# Patient Record
Sex: FEMALE | Race: WHITE | NOT HISPANIC OR LATINO
[De-identification: names, ages, dates, MRNs, and addresses within clinical notes are randomized per-mention and may not be internally consistent; named-entity substitution may affect disease eponyms.]

---

## 2017-01-11 ENCOUNTER — APPOINTMENT (OUTPATIENT)
Dept: CARDIOLOGY | Facility: CLINIC | Age: 67
End: 2017-01-11

## 2017-01-11 ENCOUNTER — NON-APPOINTMENT (OUTPATIENT)
Age: 67
End: 2017-01-11

## 2017-01-11 VITALS
HEART RATE: 60 BPM | BODY MASS INDEX: 24.99 KG/M2 | HEIGHT: 65 IN | OXYGEN SATURATION: 98 % | SYSTOLIC BLOOD PRESSURE: 142 MMHG | DIASTOLIC BLOOD PRESSURE: 71 MMHG | WEIGHT: 150 LBS

## 2017-01-11 DIAGNOSIS — I35.0 NONRHEUMATIC AORTIC (VALVE) STENOSIS: ICD-10-CM

## 2017-06-16 ENCOUNTER — APPOINTMENT (OUTPATIENT)
Dept: CARDIOLOGY | Facility: CLINIC | Age: 67
End: 2017-06-16

## 2017-06-16 VITALS — SYSTOLIC BLOOD PRESSURE: 110 MMHG | DIASTOLIC BLOOD PRESSURE: 68 MMHG

## 2017-06-16 VITALS
OXYGEN SATURATION: 99 % | SYSTOLIC BLOOD PRESSURE: 97 MMHG | HEART RATE: 55 BPM | DIASTOLIC BLOOD PRESSURE: 61 MMHG | WEIGHT: 162 LBS | HEIGHT: 65 IN | BODY MASS INDEX: 26.99 KG/M2

## 2017-06-16 DIAGNOSIS — Z09 ENCOUNTER FOR FOLLOW-UP EXAMINATION AFTER COMPLETED TREATMENT FOR CONDITIONS OTHER THAN MALIGNANT NEOPLASM: ICD-10-CM

## 2017-06-16 DIAGNOSIS — E03.9 HYPOTHYROIDISM, UNSPECIFIED: ICD-10-CM

## 2017-06-16 RX ORDER — APIXABAN 5 MG/1
5 TABLET, FILM COATED ORAL
Qty: 30 | Refills: 3 | Status: ACTIVE | COMMUNITY
Start: 2017-06-10

## 2017-06-16 RX ORDER — IBANDRONATE SODIUM 150 MG/1
150 TABLET ORAL
Qty: 3 | Refills: 0 | Status: ACTIVE | COMMUNITY
Start: 2017-03-06

## 2017-07-06 ENCOUNTER — APPOINTMENT (OUTPATIENT)
Dept: CARDIOLOGY | Facility: CLINIC | Age: 67
End: 2017-07-06

## 2017-07-06 VITALS
DIASTOLIC BLOOD PRESSURE: 75 MMHG | OXYGEN SATURATION: 98 % | WEIGHT: 159 LBS | HEART RATE: 52 BPM | SYSTOLIC BLOOD PRESSURE: 150 MMHG | BODY MASS INDEX: 26.46 KG/M2

## 2017-07-06 VITALS — DIASTOLIC BLOOD PRESSURE: 82 MMHG | SYSTOLIC BLOOD PRESSURE: 144 MMHG | HEART RATE: 49 BPM

## 2017-07-06 DIAGNOSIS — Z78.9 OTHER SPECIFIED HEALTH STATUS: ICD-10-CM

## 2017-07-06 DIAGNOSIS — I25.10 ATHEROSCLEROTIC HEART DISEASE OF NATIVE CORONARY ARTERY W/OUT ANGINA PECTORIS: ICD-10-CM

## 2017-07-06 DIAGNOSIS — R06.09 OTHER FORMS OF DYSPNEA: ICD-10-CM

## 2017-07-06 DIAGNOSIS — R00.2 PALPITATIONS: ICD-10-CM

## 2017-07-06 DIAGNOSIS — I48.91 UNSPECIFIED ATRIAL FIBRILLATION: ICD-10-CM

## 2017-07-06 DIAGNOSIS — R53.83 OTHER FATIGUE: ICD-10-CM

## 2017-07-06 DIAGNOSIS — E78.00 PURE HYPERCHOLESTEROLEMIA, UNSPECIFIED: ICD-10-CM

## 2017-07-07 RX ORDER — METOPROLOL SUCCINATE 25 MG/1
25 TABLET, EXTENDED RELEASE ORAL DAILY
Qty: 1 | Refills: 0 | Status: DISCONTINUED | COMMUNITY
Start: 2017-06-16 | End: 2017-07-07

## 2017-07-20 ENCOUNTER — NON-APPOINTMENT (OUTPATIENT)
Age: 67
End: 2017-07-20

## 2017-07-20 PROBLEM — Z09 HOSPITAL DISCHARGE FOLLOW-UP: Status: ACTIVE | Noted: 2017-07-20

## 2017-08-01 ENCOUNTER — RESULT CHARGE (OUTPATIENT)
Age: 67
End: 2017-08-01

## 2017-08-01 ENCOUNTER — APPOINTMENT (OUTPATIENT)
Dept: CARDIOLOGY | Facility: CLINIC | Age: 67
End: 2017-08-01
Payer: MEDICARE

## 2017-08-01 PROCEDURE — 93268 ECG RECORD/REVIEW: CPT

## 2017-08-20 ENCOUNTER — NON-APPOINTMENT (OUTPATIENT)
Age: 67
End: 2017-08-20

## 2017-08-20 PROBLEM — R00.2 HEART PALPITATIONS: Status: ACTIVE | Noted: 2017-07-20

## 2017-08-20 PROBLEM — R53.83 FATIGUE: Status: ACTIVE | Noted: 2017-08-20

## 2017-08-31 ENCOUNTER — APPOINTMENT (OUTPATIENT)
Dept: DERMATOLOGY | Facility: CLINIC | Age: 67
End: 2017-08-31
Payer: MEDICARE

## 2017-08-31 PROCEDURE — 99202 OFFICE O/P NEW SF 15 MIN: CPT | Mod: 25

## 2017-08-31 PROCEDURE — 17110 DESTRUCTION B9 LES UP TO 14: CPT

## 2017-08-31 PROCEDURE — 17000 DESTRUCT PREMALG LESION: CPT | Mod: 59

## 2017-09-26 ENCOUNTER — APPOINTMENT (OUTPATIENT)
Dept: CARDIOLOGY | Facility: CLINIC | Age: 67
End: 2017-09-26

## 2017-09-29 ENCOUNTER — APPOINTMENT (OUTPATIENT)
Dept: CARDIOLOGY | Facility: CLINIC | Age: 67
End: 2017-09-29

## 2017-10-06 ENCOUNTER — APPOINTMENT (OUTPATIENT)
Dept: NEUROLOGY | Facility: CLINIC | Age: 67
End: 2017-10-06
Payer: MEDICARE

## 2017-10-06 VITALS
SYSTOLIC BLOOD PRESSURE: 128 MMHG | WEIGHT: 152 LBS | BODY MASS INDEX: 25.33 KG/M2 | HEIGHT: 65 IN | DIASTOLIC BLOOD PRESSURE: 74 MMHG

## 2017-10-06 PROCEDURE — 99213 OFFICE O/P EST LOW 20 MIN: CPT

## 2017-12-15 ENCOUNTER — MEDICATION RENEWAL (OUTPATIENT)
Age: 67
End: 2017-12-15

## 2018-06-26 ENCOUNTER — APPOINTMENT (OUTPATIENT)
Dept: NEUROLOGY | Facility: CLINIC | Age: 68
End: 2018-06-26
Payer: MEDICARE

## 2018-06-26 VITALS
WEIGHT: 155 LBS | BODY MASS INDEX: 25.83 KG/M2 | SYSTOLIC BLOOD PRESSURE: 125 MMHG | HEIGHT: 65 IN | DIASTOLIC BLOOD PRESSURE: 70 MMHG

## 2018-06-26 DIAGNOSIS — G44.209 TENSION-TYPE HEADACHE, UNSPECIFIED, NOT INTRACTABLE: ICD-10-CM

## 2018-06-26 DIAGNOSIS — M79.89 OTHER SPECIFIED SOFT TISSUE DISORDERS: ICD-10-CM

## 2018-06-26 PROCEDURE — 99215 OFFICE O/P EST HI 40 MIN: CPT

## 2018-06-28 ENCOUNTER — APPOINTMENT (OUTPATIENT)
Dept: NEUROLOGY | Facility: CLINIC | Age: 68
End: 2018-06-28
Payer: MEDICARE

## 2018-06-28 PROCEDURE — 93040 RHYTHM ECG WITH REPORT: CPT

## 2018-06-28 PROCEDURE — 95819 EEG AWAKE AND ASLEEP: CPT

## 2018-10-02 ENCOUNTER — APPOINTMENT (OUTPATIENT)
Dept: NEUROLOGY | Facility: CLINIC | Age: 68
End: 2018-10-02
Payer: MEDICARE

## 2018-10-02 VITALS
DIASTOLIC BLOOD PRESSURE: 70 MMHG | SYSTOLIC BLOOD PRESSURE: 120 MMHG | BODY MASS INDEX: 25.83 KG/M2 | WEIGHT: 155 LBS | HEIGHT: 65 IN

## 2018-10-02 PROCEDURE — 99213 OFFICE O/P EST LOW 20 MIN: CPT

## 2018-10-19 ENCOUNTER — TRANSCRIPTION ENCOUNTER (OUTPATIENT)
Age: 68
End: 2018-10-19

## 2018-10-30 ENCOUNTER — TRANSCRIPTION ENCOUNTER (OUTPATIENT)
Age: 68
End: 2018-10-30

## 2018-11-06 ENCOUNTER — TRANSCRIPTION ENCOUNTER (OUTPATIENT)
Age: 68
End: 2018-11-06

## 2019-01-24 ENCOUNTER — RX RENEWAL (OUTPATIENT)
Age: 69
End: 2019-01-24

## 2019-01-30 ENCOUNTER — APPOINTMENT (OUTPATIENT)
Dept: DERMATOLOGY | Facility: CLINIC | Age: 69
End: 2019-01-30
Payer: MEDICARE

## 2019-01-30 PROCEDURE — 17003 DESTRUCT PREMALG LES 2-14: CPT

## 2019-01-30 PROCEDURE — 99214 OFFICE O/P EST MOD 30 MIN: CPT | Mod: 25

## 2019-01-30 PROCEDURE — 17000 DESTRUCT PREMALG LESION: CPT

## 2019-05-07 ENCOUNTER — APPOINTMENT (OUTPATIENT)
Dept: NEUROLOGY | Facility: CLINIC | Age: 69
End: 2019-05-07
Payer: MEDICARE

## 2019-05-07 VITALS
WEIGHT: 152 LBS | DIASTOLIC BLOOD PRESSURE: 70 MMHG | SYSTOLIC BLOOD PRESSURE: 102 MMHG | HEIGHT: 65 IN | BODY MASS INDEX: 25.33 KG/M2

## 2019-05-07 PROCEDURE — 99213 OFFICE O/P EST LOW 20 MIN: CPT

## 2019-05-07 NOTE — PHYSICAL EXAM
[Person] : oriented to person [Time] : oriented to time [Remote Intact] : remote memory intact [Place] : oriented to place [Concentration Intact] : normal concentrating ability [Span Intact] : the attention span was normal [Registration Intact] : recent registration memory intact [Visual Intact] : visual attention was ~T not ~L decreased [Naming Objects] : no difficulty naming common objects [Fluency] : fluency intact [Comprehension] : comprehension intact [Repeating Phrases] : no difficulty repeating a phrase [Past History] : adequate knowledge of personal past history [Current Events] : adequate knowledge of current events [Cranial Nerves Optic (II)] : visual acuity intact bilaterally,  visual fields full to confrontation, pupils equal round and reactive to light [Cranial Nerves Oculomotor (III)] : extraocular motion intact [Cranial Nerves Facial (VII)] : face symmetrical [Cranial Nerves Trigeminal (V)] : facial sensation intact symmetrically [Cranial Nerves Vestibulocochlear (VIII)] : hearing was intact bilaterally [Cranial Nerves Glossopharyngeal (IX)] : tongue and palate midline [Cranial Nerves Accessory (XI - Cranial And Spinal)] : head turning and shoulder shrug symmetric [Cranial Nerves Hypoglossal (XII)] : there was no tongue deviation with protrusion [No Muscle Atrophy] : normal bulk in all four extremities [Motor Strength] : muscle strength was normal in all four extremities [Involuntary Movements] : no involuntary movements were seen [Motor Tone] : muscle tone was normal in all four extremities [Paresis Pronator Drift Right-Sided] : no pronator drift on the right [Motor Handedness Right-Handed] : the patient is right hand dominant [Paresis Pronator Drift Left-Sided] : no pronator drift on the left [Sensation Tactile Decrease] : light touch was intact [Sensation Vibration Decrease] : vibration was intact [Sensation Pain / Temperature Decrease] : pain and temperature was intact [Proprioception] : proprioception was intact [Abnormal Walk] : normal gait [Balance] : balance was intact [Coordination - Dysmetria Impaired Finger-to-Nose Bilateral] : not present [Tremor] : no tremor present [2+] : Patella left 2+ [Extraocular Movements] : extraocular movements were intact [Sclera] : the sclera and conjunctiva were normal [PERRL With Normal Accommodation] : pupils were equal in size, round, reactive to light, with normal accommodation [No APD] : no afferent pupillary defect [Full Visual Field] : full visual field [No DREA] : no internuclear ophthalmoplegia

## 2019-05-07 NOTE — ASSESSMENT
[FreeTextEntry1] : This is a 69-year-old woman with history of seizure disorder. She is currently stable on the medical service on milligrams twice daily. She will continue this dose. I will see her back in the office in 6 months, sooner should the need arise.

## 2019-05-07 NOTE — CONSULT LETTER
[Courtesy Letter:] : I had the pleasure of seeing your patient, [unfilled], in my office today. [Dear  ___] : Dear  [unfilled], [Please see my note below.] : Please see my note below. [Consult Closing:] : Thank you very much for allowing me to participate in the care of this patient.  If you have any questions, please do not hesitate to contact me. [FreeTextEntry3] : Mauro Barboza M.D., Ph.D. DPN-N\par [facility]\par Neurology at [place]\par Medical Director of Stroke Services\par [facility]\par  [Sincerely,] : Sincerely,

## 2019-05-07 NOTE — HISTORY OF PRESENT ILLNESS
[FreeTextEntry1] : 10/6/17:\par This is a 67-year-old woman follows up today with seizure disorder. She is currently taking Lamictal 75 mg twice a day. She is stable without any evidence of seizure activity. When asked she hasn't had any evidence of rash. She did have an episode of atrial fibrillation in June. She was started Eliquis as well as amiodarone for this. She is doing well otherwise. She is here today for routine neurologic followup.\par \par Followup June 26, 2018:\par This is a 68-year-old woman follows up today with seizure disorder. She is also had new complaints of an episode of syncope, headache as well as leg swelling. Regarding her seizures she is stable on lamotrigine 75 mg twice a day. She does not have any seizures since being seen last October however she did have an episode of syncope. She states this was in the setting of stress and dehydration when her  was in the hospital for surgery. She felt as if she was going to collapse and did. She was given orange juice and liquids and then felt better. Subsequent to this she had an episode of dull headache. It started in the occiput and radiated up to the front. She was treated with a Medrol Dosepak and the headache was relieved. Lastly she just returned from a trip to [place] and has bilateral left greater than right leg swelling. She states her leg swelling started while in [place] and she attempted to walking. She is on anticoagulation with Eliquis for her atrial fibrillation. She is here today for neurologic followup and to address these new symptoms.\par \par Followup October 2, 2018:\par This is a 68-year-old woman who presents today for followup of seizure disorder. She's not had any seizures recently. She is maintained on Lamictal 75 mg twice daily. She's taking the medication as directed without side effects. She's overall doing quite well. She is here today for routine neurologic followup.\par \par Followup May 7, 2019:\par This is a 69-year-old woman who presents today for followup of seizure disorder. She is currently taking Lamictal 75 mg twice daily. She has not had any seizures. She tolerates the medication well. She is not missing doses. She's overall doing very well from a seizure point of view. She is here today for routine neurologic followup.

## 2019-09-03 ENCOUNTER — TRANSCRIPTION ENCOUNTER (OUTPATIENT)
Age: 69
End: 2019-09-03

## 2019-11-05 ENCOUNTER — APPOINTMENT (OUTPATIENT)
Dept: NEUROLOGY | Facility: CLINIC | Age: 69
End: 2019-11-05

## 2019-12-17 ENCOUNTER — APPOINTMENT (OUTPATIENT)
Dept: NEUROLOGY | Facility: CLINIC | Age: 69
End: 2019-12-17
Payer: MEDICARE

## 2019-12-17 VITALS
HEIGHT: 65 IN | DIASTOLIC BLOOD PRESSURE: 74 MMHG | WEIGHT: 155 LBS | BODY MASS INDEX: 25.83 KG/M2 | SYSTOLIC BLOOD PRESSURE: 128 MMHG

## 2019-12-17 PROCEDURE — 99213 OFFICE O/P EST LOW 20 MIN: CPT

## 2019-12-17 NOTE — HISTORY OF PRESENT ILLNESS
[FreeTextEntry1] : 10/6/17:\par This is a 67-year-old woman follows up today with seizure disorder. She is currently taking Lamictal 75 mg twice a day. She is stable without any evidence of seizure activity. When asked she hasn't had any evidence of rash. She did have an episode of atrial fibrillation in June. She was started Eliquis as well as amiodarone for this. She is doing well otherwise. She is here today for routine neurologic followup.\par \par Followup June 26, 2018:\par This is a 68-year-old woman follows up today with seizure disorder. She is also had new complaints of an episode of syncope, headache as well as leg swelling. Regarding her seizures she is stable on lamotrigine 75 mg twice a day. She does not have any seizures since being seen last October however she did have an episode of syncope. She states this was in the setting of stress and dehydration when her  was in the hospital for surgery. She felt as if she was going to collapse and did. She was given orange juice and liquids and then felt better. Subsequent to this she had an episode of dull headache. It started in the occiput and radiated up to the front. She was treated with a Medrol Dosepak and the headache was relieved. Lastly she just returned from a trip to [place] and has bilateral left greater than right leg swelling. She states her leg swelling started while in [place] and she attempted to walking. She is on anticoagulation with Eliquis for her atrial fibrillation. She is here today for neurologic followup and to address these new symptoms.\par \par Followup October 2, 2018:\par This is a 68-year-old woman who presents today for followup of seizure disorder. She's not had any seizures recently. She is maintained on Lamictal 75 mg twice daily. She's taking the medication as directed without side effects. She's overall doing quite well. She is here today for routine neurologic followup.\par \par Followup May 7, 2019:\par This is a 69-year-old woman who presents today for followup of seizure disorder. She is currently taking Lamictal 75 mg twice daily. She has not had any seizures. She tolerates the medication well. She is not missing doses. She's overall doing very well from a seizure point of view. She is here today for routine neurologic followup.\par \par Followup December 17, 2019:\par This is a 69-year-old woman who presents today for followup of seizure disorder. She is currently stable on Lamictal 75 mg twice a day. She is not having any side effects to the medication. She reports excellent compliance. She is here today for routine followup.

## 2019-12-17 NOTE — PHYSICAL EXAM
[Person] : oriented to person [Place] : oriented to place [Time] : oriented to time [Remote Intact] : remote memory intact [Registration Intact] : recent registration memory intact [Span Intact] : the attention span was normal [Concentration Intact] : normal concentrating ability [Visual Intact] : visual attention was ~T not ~L decreased [Naming Objects] : no difficulty naming common objects [Repeating Phrases] : no difficulty repeating a phrase [Fluency] : fluency intact [Comprehension] : comprehension intact [Current Events] : adequate knowledge of current events [Past History] : adequate knowledge of personal past history [Cranial Nerves Optic (II)] : visual acuity intact bilaterally,  visual fields full to confrontation, pupils equal round and reactive to light [Cranial Nerves Oculomotor (III)] : extraocular motion intact [Cranial Nerves Trigeminal (V)] : facial sensation intact symmetrically [Cranial Nerves Facial (VII)] : face symmetrical [Cranial Nerves Vestibulocochlear (VIII)] : hearing was intact bilaterally [Cranial Nerves Accessory (XI - Cranial And Spinal)] : head turning and shoulder shrug symmetric [Cranial Nerves Glossopharyngeal (IX)] : tongue and palate midline [Cranial Nerves Hypoglossal (XII)] : there was no tongue deviation with protrusion [Motor Strength] : muscle strength was normal in all four extremities [Motor Tone] : muscle tone was normal in all four extremities [Involuntary Movements] : no involuntary movements were seen [No Muscle Atrophy] : normal bulk in all four extremities [Motor Handedness Right-Handed] : the patient is right hand dominant [Paresis Pronator Drift Right-Sided] : no pronator drift on the right [Paresis Pronator Drift Left-Sided] : no pronator drift on the left [Motor Strength Lower Extremities Bilaterally] : strength was normal in both lower extremities [Motor Strength Upper Extremities Bilaterally] : strength was normal in both upper extremities [Sensation Tactile Decrease] : light touch was intact [Sensation Vibration Decrease] : vibration was intact [Sensation Pain / Temperature Decrease] : pain and temperature was intact [Proprioception] : proprioception was intact [Abnormal Walk] : normal gait [Balance] : balance was intact [Tremor] : no tremor present [Coordination - Dysmetria Impaired Finger-to-Nose Bilateral] : not present [2+] : Patella left 2+ [Sclera] : the sclera and conjunctiva were normal [PERRL With Normal Accommodation] : pupils were equal in size, round, reactive to light, with normal accommodation [Extraocular Movements] : extraocular movements were intact [No APD] : no afferent pupillary defect [No DREA] : no internuclear ophthalmoplegia [Full Visual Field] : full visual field

## 2019-12-17 NOTE — ASSESSMENT
[FreeTextEntry1] : This is a 69-year-old woman with a seizure disorder. She is currently stable on Lamictal 75 mg twice a day. I will continue her on this dose. I will see her back in 6 months, sooner should the need arise. I asked her to call me immediately should she have any breakthrough seizures or develop a rash to the Lamictal.

## 2019-12-17 NOTE — CONSULT LETTER
[Dear  ___] : Dear  [unfilled], [Courtesy Letter:] : I had the pleasure of seeing your patient, [unfilled], in my office today. [Please see my note below.] : Please see my note below. [Consult Closing:] : Thank you very much for allowing me to participate in the care of this patient.  If you have any questions, please do not hesitate to contact me. [Sincerely,] : Sincerely, [FreeTextEntry3] : Mauro Barboza M.D., Ph.D. DPN-N\par [facility]\par Neurology at [place]\par Medical Director of Stroke Services\par [facility]\par

## 2020-02-06 ENCOUNTER — TRANSCRIPTION ENCOUNTER (OUTPATIENT)
Age: 70
End: 2020-02-06

## 2020-06-16 ENCOUNTER — APPOINTMENT (OUTPATIENT)
Dept: NEUROLOGY | Facility: CLINIC | Age: 70
End: 2020-06-16

## 2020-11-23 ENCOUNTER — EMERGENCY (EMERGENCY)
Facility: HOSPITAL | Age: 70
LOS: 1 days | Discharge: HOME | End: 2020-11-23
Attending: EMERGENCY MEDICINE
Payer: MEDICARE

## 2020-11-23 VITALS
RESPIRATION RATE: 18 BRPM | SYSTOLIC BLOOD PRESSURE: 199 MMHG | HEIGHT: 65 IN | DIASTOLIC BLOOD PRESSURE: 98 MMHG | WEIGHT: 149.91 LBS | TEMPERATURE: 98 F | HEART RATE: 58 BPM | OXYGEN SATURATION: 97 %

## 2020-11-23 DIAGNOSIS — Z98.89 OTHER SPECIFIED POSTPROCEDURAL STATES: Chronic | ICD-10-CM

## 2020-11-23 DIAGNOSIS — Z95.5 PRESENCE OF CORONARY ANGIOPLASTY IMPLANT AND GRAFT: Chronic | ICD-10-CM

## 2020-11-23 LAB
ALBUMIN SERPL ELPH-MCNC: 4.1 G/DL — SIGNIFICANT CHANGE UP (ref 3.3–5.2)
ALP SERPL-CCNC: 46 U/L — SIGNIFICANT CHANGE UP (ref 40–120)
ALT FLD-CCNC: 35 U/L — HIGH
ANION GAP SERPL CALC-SCNC: 13 MMOL/L — SIGNIFICANT CHANGE UP (ref 5–17)
APPEARANCE UR: CLEAR — SIGNIFICANT CHANGE UP
APTT BLD: 31.4 SEC — SIGNIFICANT CHANGE UP (ref 27.5–35.5)
AST SERPL-CCNC: 40 U/L — HIGH
BACTERIA # UR AUTO: ABNORMAL
BASOPHILS # BLD AUTO: 0.04 K/UL — SIGNIFICANT CHANGE UP (ref 0–0.2)
BASOPHILS NFR BLD AUTO: 0.8 % — SIGNIFICANT CHANGE UP (ref 0–2)
BILIRUB SERPL-MCNC: 0.3 MG/DL — LOW (ref 0.4–2)
BILIRUB UR-MCNC: NEGATIVE — SIGNIFICANT CHANGE UP
BUN SERPL-MCNC: 18 MG/DL — SIGNIFICANT CHANGE UP (ref 8–20)
CALCIUM SERPL-MCNC: 9.2 MG/DL — SIGNIFICANT CHANGE UP (ref 8.6–10.2)
CHLORIDE SERPL-SCNC: 99 MMOL/L — SIGNIFICANT CHANGE UP (ref 98–107)
CO2 SERPL-SCNC: 23 MMOL/L — SIGNIFICANT CHANGE UP (ref 22–29)
COLOR SPEC: YELLOW — SIGNIFICANT CHANGE UP
CREAT SERPL-MCNC: 1.26 MG/DL — SIGNIFICANT CHANGE UP (ref 0.5–1.3)
DIFF PNL FLD: ABNORMAL
EOSINOPHIL # BLD AUTO: 0.04 K/UL — SIGNIFICANT CHANGE UP (ref 0–0.5)
EOSINOPHIL NFR BLD AUTO: 0.8 % — SIGNIFICANT CHANGE UP (ref 0–6)
EPI CELLS # UR: SIGNIFICANT CHANGE UP
GLUCOSE SERPL-MCNC: 104 MG/DL — HIGH (ref 70–99)
GLUCOSE UR QL: NEGATIVE MG/DL — SIGNIFICANT CHANGE UP
HCT VFR BLD CALC: 41.1 % — SIGNIFICANT CHANGE UP (ref 34.5–45)
HGB BLD-MCNC: 13.7 G/DL — SIGNIFICANT CHANGE UP (ref 11.5–15.5)
IMM GRANULOCYTES NFR BLD AUTO: 0.4 % — SIGNIFICANT CHANGE UP (ref 0–1.5)
INR BLD: 1.11 RATIO — SIGNIFICANT CHANGE UP (ref 0.88–1.16)
KETONES UR-MCNC: NEGATIVE — SIGNIFICANT CHANGE UP
LEUKOCYTE ESTERASE UR-ACNC: ABNORMAL
LYMPHOCYTES # BLD AUTO: 1.11 K/UL — SIGNIFICANT CHANGE UP (ref 1–3.3)
LYMPHOCYTES # BLD AUTO: 21.6 % — SIGNIFICANT CHANGE UP (ref 13–44)
MCHC RBC-ENTMCNC: 30.2 PG — SIGNIFICANT CHANGE UP (ref 27–34)
MCHC RBC-ENTMCNC: 33.3 GM/DL — SIGNIFICANT CHANGE UP (ref 32–36)
MCV RBC AUTO: 90.7 FL — SIGNIFICANT CHANGE UP (ref 80–100)
MONOCYTES # BLD AUTO: 0.48 K/UL — SIGNIFICANT CHANGE UP (ref 0–0.9)
MONOCYTES NFR BLD AUTO: 9.3 % — SIGNIFICANT CHANGE UP (ref 2–14)
NEUTROPHILS # BLD AUTO: 3.45 K/UL — SIGNIFICANT CHANGE UP (ref 1.8–7.4)
NEUTROPHILS NFR BLD AUTO: 67.1 % — SIGNIFICANT CHANGE UP (ref 43–77)
NITRITE UR-MCNC: NEGATIVE — SIGNIFICANT CHANGE UP
PH UR: 7 — SIGNIFICANT CHANGE UP (ref 5–8)
PLATELET # BLD AUTO: 204 K/UL — SIGNIFICANT CHANGE UP (ref 150–400)
POTASSIUM SERPL-MCNC: 4.3 MMOL/L — SIGNIFICANT CHANGE UP (ref 3.5–5.3)
POTASSIUM SERPL-SCNC: 4.3 MMOL/L — SIGNIFICANT CHANGE UP (ref 3.5–5.3)
PROT SERPL-MCNC: 7.2 G/DL — SIGNIFICANT CHANGE UP (ref 6.6–8.7)
PROT UR-MCNC: NEGATIVE MG/DL — SIGNIFICANT CHANGE UP
PROTHROM AB SERPL-ACNC: 12.8 SEC — SIGNIFICANT CHANGE UP (ref 10.6–13.6)
RAPID RVP RESULT: SIGNIFICANT CHANGE UP
RBC # BLD: 4.53 M/UL — SIGNIFICANT CHANGE UP (ref 3.8–5.2)
RBC # FLD: 13.9 % — SIGNIFICANT CHANGE UP (ref 10.3–14.5)
RBC CASTS # UR COMP ASSIST: ABNORMAL /HPF (ref 0–4)
SARS-COV-2 RNA SPEC QL NAA+PROBE: SIGNIFICANT CHANGE UP
SODIUM SERPL-SCNC: 135 MMOL/L — SIGNIFICANT CHANGE UP (ref 135–145)
SP GR SPEC: 1 — LOW (ref 1.01–1.02)
TROPONIN T SERPL-MCNC: <0.01 NG/ML — SIGNIFICANT CHANGE UP (ref 0–0.06)
UROBILINOGEN FLD QL: NEGATIVE MG/DL — SIGNIFICANT CHANGE UP
WBC # BLD: 5.14 K/UL — SIGNIFICANT CHANGE UP (ref 3.8–10.5)
WBC # FLD AUTO: 5.14 K/UL — SIGNIFICANT CHANGE UP (ref 3.8–10.5)
WBC UR QL: SIGNIFICANT CHANGE UP

## 2020-11-23 PROCEDURE — 70450 CT HEAD/BRAIN W/O DYE: CPT | Mod: 26

## 2020-11-23 PROCEDURE — 93010 ELECTROCARDIOGRAM REPORT: CPT

## 2020-11-23 PROCEDURE — 71046 X-RAY EXAM CHEST 2 VIEWS: CPT | Mod: 26

## 2020-11-23 PROCEDURE — 99218: CPT

## 2020-11-23 RX ORDER — AMLODIPINE BESYLATE 2.5 MG/1
5 TABLET ORAL ONCE
Refills: 0 | Status: COMPLETED | OUTPATIENT
Start: 2020-11-23 | End: 2020-11-23

## 2020-11-23 RX ADMIN — AMLODIPINE BESYLATE 5 MILLIGRAM(S): 2.5 TABLET ORAL at 19:06

## 2020-11-23 NOTE — ED PROVIDER NOTE - PHYSICAL EXAMINATION
Const: Awake, alert and oriented. In no acute distress. Well appearing.  HEENT: NC/AT. Moist mucous membranes.  Eyes: No scleral icterus. EOMI.  Neck:. Soft and supple. Full ROM without pain.  Cardiac: Regular rate and regular rhythm. +S1/S2. No murmurs. Peripheral pulses 2+ and symmetric. No LE edema.  Resp: Speaking in full sentences. No evidence of respiratory distress. No wheezes, rales or rhonchi.  Abd: Soft, non-tender, non-distended. Normal bowel sounds in all 4 quadrants. No guarding or rebound.  Back: Spine midline and non-tender. No CVAT.  Skin: No rashes, abrasions or lacerations.  Neuro: CN II-XII grossly in tact. Symmetrical smile. PERRL. EOMI. Bilateral and symmetric sensation of face. Tongue midline. Normal finger to nose. Normal heel to shin. Normal rapid alternating movements. No pronator drift. Sensation symmetrically intact bilateral upper and lower extremities.

## 2020-11-23 NOTE — ED PROVIDER NOTE - CLINICAL SUMMARY MEDICAL DECISION MAKING FREE TEXT BOX
71 y/o F presents s/p a near syncopal episode today, has seizure disorder (no seizures in years), fell to the floor, unsure of head trauma, on Eliquis. CT head ordered, EKG is non-ischemic, but bradycardic, will check cardiac labs, evaluate for dehydration, infectious etiology. Will control BP and cardiology consult.

## 2020-11-23 NOTE — ED PROVIDER NOTE - PROGRESS NOTE DETAILS
Will place on OBS for continued cardiac monitoring, serial Trop and eval by Cardio (consult called to [facility])

## 2020-11-23 NOTE — ED ADULT TRIAGE NOTE - CHIEF COMPLAINT QUOTE
Patient arrived to ED today with c/o runny nose, nausea, lightheadedness since this afternoon.  Patient reports she felt lightheadedness, weak and then she fell to the ground today.  Patient denies LOC.

## 2020-11-23 NOTE — ED ADULT NURSE REASSESSMENT NOTE - NS ED NURSE REASSESS COMMENT FT1
assumed care of pt, Pt in no apparent distress at this time. Airway patent, breathing spontaneous and nonlabored. Pt A&Ox3 resting in stretcher. Pt with no complaints at this time,

## 2020-11-23 NOTE — ED PROVIDER NOTE - NS ED ROS FT
Const: Denies fever, chills  HEENT: Denies blurry vision, sore throat  Neck: Denies neck pain/stiffness  Resp: Denies coughing, SOB  Cardiovascular: + syncope. Denies CP, palpitations, LE edema  GI: Denies nausea, vomiting, abdominal pain, diarrhea, constipation, blood in stool  : Denies urinary frequency/urgency/dysuria, hematuria  MSK: Denies back pain  Neuro: + lightheadedness. Denies HA, numbness, weakness  Skin: Denies rashes.

## 2020-11-23 NOTE — ED ADULT NURSE NOTE - OBJECTIVE STATEMENT
Pt kasie presents complaining of a near-syncopal episode that occurred this morning. She describes URI symptoms for the past 5 days of sinus congestion, rhinorrhea and feeling "generally weak". Today she had a near syncopal episode, fell to the floor, denies complete LOC, fall was witnessed by her , unsure if she hit her head. She describes since the episode that she is feeling "lightheaded", not as though the room is spinning. She felt as though she was going to pass out prior to her episode today, but denies CP, SOB, blurry vision or focal weakness prior to the episode. She has otherwise been in her usual state of health.

## 2020-11-23 NOTE — ED PROVIDER NOTE - OBJECTIVE STATEMENT
69 y/o M with PMH HTN, HLD, PAF (on Eliquis), seizure d/o presents complaining of a near-syncopal episode that occurred this morning. She describes URI symptoms for the past 5 days of sinus congestion, rhinorrhea and feeling "generally weak". Today she had a near syncopal episode, fell to the floor, denies complete LOC, fall was witnessed by her , unsure if she hit her head. She describes since the episode that she is feeling "lightheaded", not as though the room is spinning. She felt as though she was going to pass out prior to her episode today, but denies CP, SOB, blurry vision or focal weakness prior to the episode. She has otherwise been in her usual state of health. She follows with Dr. Frausto for cardiology, but cannot recall her last echo or stress test. She notes a prior episode of near syncope many years ago, none since. She denies allergies to medications, denies smoking, EtOH or illicit drugs. She states she went out to eat recently and is afraid she may have contracted something. She had a COVID test done 3 days ago, but was told 5-7 days for the results.

## 2020-11-24 VITALS
HEART RATE: 48 BPM | DIASTOLIC BLOOD PRESSURE: 72 MMHG | RESPIRATION RATE: 16 BRPM | OXYGEN SATURATION: 98 % | SYSTOLIC BLOOD PRESSURE: 165 MMHG

## 2020-11-24 DIAGNOSIS — Z95.5 PRESENCE OF CORONARY ANGIOPLASTY IMPLANT AND GRAFT: Chronic | ICD-10-CM

## 2020-11-24 DIAGNOSIS — Z98.890 OTHER SPECIFIED POSTPROCEDURAL STATES: Chronic | ICD-10-CM

## 2020-11-24 DIAGNOSIS — Z90.49 ACQUIRED ABSENCE OF OTHER SPECIFIED PARTS OF DIGESTIVE TRACT: Chronic | ICD-10-CM

## 2020-11-24 LAB — TROPONIN T SERPL-MCNC: <0.01 NG/ML — SIGNIFICANT CHANGE UP (ref 0–0.06)

## 2020-11-24 PROCEDURE — 85025 COMPLETE CBC W/AUTO DIFF WBC: CPT

## 2020-11-24 PROCEDURE — 81001 URINALYSIS AUTO W/SCOPE: CPT

## 2020-11-24 PROCEDURE — 84484 ASSAY OF TROPONIN QUANT: CPT

## 2020-11-24 PROCEDURE — G0378: CPT

## 2020-11-24 PROCEDURE — 0225U NFCT DS DNA&RNA 21 SARSCOV2: CPT

## 2020-11-24 PROCEDURE — 70450 CT HEAD/BRAIN W/O DYE: CPT

## 2020-11-24 PROCEDURE — 93005 ELECTROCARDIOGRAM TRACING: CPT

## 2020-11-24 PROCEDURE — 36415 COLL VENOUS BLD VENIPUNCTURE: CPT

## 2020-11-24 PROCEDURE — 80053 COMPREHEN METABOLIC PANEL: CPT

## 2020-11-24 PROCEDURE — 99217: CPT

## 2020-11-24 PROCEDURE — 96361 HYDRATE IV INFUSION ADD-ON: CPT

## 2020-11-24 PROCEDURE — 71046 X-RAY EXAM CHEST 2 VIEWS: CPT

## 2020-11-24 PROCEDURE — 85730 THROMBOPLASTIN TIME PARTIAL: CPT

## 2020-11-24 PROCEDURE — 87186 SC STD MICRODIL/AGAR DIL: CPT

## 2020-11-24 PROCEDURE — 87086 URINE CULTURE/COLONY COUNT: CPT

## 2020-11-24 PROCEDURE — 99284 EMERGENCY DEPT VISIT MOD MDM: CPT | Mod: 25

## 2020-11-24 PROCEDURE — 96360 HYDRATION IV INFUSION INIT: CPT

## 2020-11-24 PROCEDURE — 85610 PROTHROMBIN TIME: CPT

## 2020-11-24 RX ORDER — VALSARTAN 80 MG/1
40 TABLET ORAL DAILY
Refills: 0 | Status: DISCONTINUED | OUTPATIENT
Start: 2020-11-24 | End: 2020-11-28

## 2020-11-24 RX ORDER — IBANDRONATE SODIUM 150 MG/1
1 TABLET ORAL
Qty: 0 | Refills: 0 | DISCHARGE

## 2020-11-24 RX ORDER — VALSARTAN 80 MG/1
1 TABLET ORAL
Qty: 0 | Refills: 0 | DISCHARGE

## 2020-11-24 RX ORDER — MAGNESIUM OXIDE 400 MG ORAL TABLET 241.3 MG
400 TABLET ORAL DAILY
Refills: 0 | Status: DISCONTINUED | OUTPATIENT
Start: 2020-11-24 | End: 2020-11-28

## 2020-11-24 RX ORDER — APIXABAN 2.5 MG/1
5 TABLET, FILM COATED ORAL EVERY 12 HOURS
Refills: 0 | Status: DISCONTINUED | OUTPATIENT
Start: 2020-11-24 | End: 2020-11-28

## 2020-11-24 RX ORDER — ATORVASTATIN CALCIUM 80 MG/1
40 TABLET, FILM COATED ORAL AT BEDTIME
Refills: 0 | Status: DISCONTINUED | OUTPATIENT
Start: 2020-11-24 | End: 2020-11-28

## 2020-11-24 RX ORDER — LEVOTHYROXINE SODIUM 125 MCG
150 TABLET ORAL DAILY
Refills: 0 | Status: DISCONTINUED | OUTPATIENT
Start: 2020-11-24 | End: 2020-11-28

## 2020-11-24 RX ORDER — LAMOTRIGINE 25 MG/1
25 TABLET, ORALLY DISINTEGRATING ORAL
Refills: 0 | Status: DISCONTINUED | OUTPATIENT
Start: 2020-11-24 | End: 2020-11-28

## 2020-11-24 RX ORDER — OMEGA-3 ACID ETHYL ESTERS 1 G
1 CAPSULE ORAL
Qty: 0 | Refills: 0 | DISCHARGE

## 2020-11-24 RX ORDER — SODIUM CHLORIDE 9 MG/ML
500 INJECTION, SOLUTION INTRAVENOUS ONCE
Refills: 0 | Status: COMPLETED | OUTPATIENT
Start: 2020-11-24 | End: 2020-11-24

## 2020-11-24 RX ORDER — AMIODARONE HYDROCHLORIDE 400 MG/1
1 TABLET ORAL
Qty: 0 | Refills: 0 | DISCHARGE

## 2020-11-24 RX ORDER — OMEPRAZOLE 10 MG/1
1 CAPSULE, DELAYED RELEASE ORAL
Qty: 0 | Refills: 0 | DISCHARGE

## 2020-11-24 RX ORDER — ASPIRIN/CALCIUM CARB/MAGNESIUM 324 MG
81 TABLET ORAL DAILY
Refills: 0 | Status: DISCONTINUED | OUTPATIENT
Start: 2020-11-24 | End: 2020-11-28

## 2020-11-24 RX ORDER — AMIODARONE HYDROCHLORIDE 400 MG/1
200 TABLET ORAL
Refills: 0 | Status: DISCONTINUED | OUTPATIENT
Start: 2020-11-24 | End: 2020-11-28

## 2020-11-24 RX ORDER — PANTOPRAZOLE SODIUM 20 MG/1
40 TABLET, DELAYED RELEASE ORAL
Refills: 0 | Status: DISCONTINUED | OUTPATIENT
Start: 2020-11-24 | End: 2020-11-28

## 2020-11-24 RX ORDER — LAMOTRIGINE 25 MG/1
1 TABLET, ORALLY DISINTEGRATING ORAL
Qty: 0 | Refills: 0 | DISCHARGE

## 2020-11-24 RX ORDER — ASPIRIN/CALCIUM CARB/MAGNESIUM 324 MG
1 TABLET ORAL
Qty: 0 | Refills: 0 | DISCHARGE

## 2020-11-24 RX ORDER — ATORVASTATIN CALCIUM 80 MG/1
1 TABLET, FILM COATED ORAL
Qty: 0 | Refills: 0 | DISCHARGE

## 2020-11-24 RX ORDER — APIXABAN 2.5 MG/1
1 TABLET, FILM COATED ORAL
Qty: 0 | Refills: 0 | DISCHARGE

## 2020-11-24 RX ORDER — LEVOTHYROXINE SODIUM 125 MCG
1 TABLET ORAL
Qty: 0 | Refills: 0 | DISCHARGE

## 2020-11-24 RX ADMIN — SODIUM CHLORIDE 500 MILLILITER(S): 9 INJECTION, SOLUTION INTRAVENOUS at 04:45

## 2020-11-24 RX ADMIN — SODIUM CHLORIDE 500 MILLILITER(S): 9 INJECTION, SOLUTION INTRAVENOUS at 07:31

## 2020-11-24 RX ADMIN — VALSARTAN 40 MILLIGRAM(S): 80 TABLET ORAL at 05:23

## 2020-11-24 RX ADMIN — Medication 150 MICROGRAM(S): at 05:24

## 2020-11-24 RX ADMIN — LAMOTRIGINE 25 MILLIGRAM(S): 25 TABLET, ORALLY DISINTEGRATING ORAL at 05:23

## 2020-11-24 RX ADMIN — APIXABAN 5 MILLIGRAM(S): 2.5 TABLET, FILM COATED ORAL at 05:38

## 2020-11-24 NOTE — ED CDU PROVIDER SUBSEQUENT DAY NOTE - ATTENDING CONTRIBUTION TO CARE
71 y/o M with PMH HTN, HLD, PAF (on Eliquis), seizure d/o presents complaining of a near-syncopal episode  General:     NAD, well-nourished, well-appearing  Head:     NC/AT, EOMI, oral mucosa moist  Neck:     trachea midline  Lungs:     CTA b/l, no w/r/r  CVS:     S1S2, RRR, no m/g/r  -pending [place] [facility] consult

## 2020-11-24 NOTE — ED CDU PROVIDER INITIAL DAY NOTE - MEDICAL DECISION MAKING DETAILS
71 y/o M with PMH HTN, HLD, PAF (on Eliquis), seizure d/o presents complaining of a near-syncopal episode that occurred this morning. At this time patient at baseline. Patient placed in CDU for serial troponin, telemetry monitoring, cardiology consult.

## 2020-11-24 NOTE — ED CDU PROVIDER SUBSEQUENT DAY NOTE - MEDICAL DECISION MAKING DETAILS
69 y/o M with PMH HTN, HLD, PAF (on Eliquis), seizure d/o presents complaining of a near-syncopal episode that occurred this morning. At this time patient at baseline. Patient placed in CDU for serial troponin, telemetry monitoring, cardiology consult.

## 2020-11-24 NOTE — ED CDU PROVIDER DISPOSITION NOTE - CLINICAL COURSE
69 y/o M with PMH HTN, HLD, PAF (on Eliquis), seizure d/o presents complaining of a near-syncopal episode that occurred this morning. She describes URI symptoms for the past 5 days of sinus congestion, rhinorrhea and feeling "generally weak". Today she had a near syncopal episode, fell to the floor, denies complete LOC, fall was witnessed by her , unsure if she hit her head. She describes since the episode that she is feeling "lightheaded", not as though the room is spinning. She felt as though she was going to pass out prior to her episode today, but denies CP, SOB, blurry vision or focal weakness prior to the episode. She has otherwise been in her usual state of health. She follows with Dr. Frausto for cardiology, but cannot recall her last echo or stress test. She notes a prior episode of near syncope many years ago, none since. She denies allergies to medications, denies smoking, EtOH or illicit drugs. She states she went out to eat recently and is afraid she may have contracted something. She had a COVID test done 3 days ago, but was told 5-7 days for the results. At this time, patient at baseline. 71 y/o M with PMH HTN, HLD, PAF (on Eliquis), seizure d/o presents complaining of a near-syncopal episode that occurred this morning. She describes URI symptoms for the past 5 days of sinus congestion, rhinorrhea and feeling "generally weak". Today she had a near syncopal episode, fell to the floor, denies complete LOC, fall was witnessed by her , unsure if she hit her head. She describes since the episode that she is feeling "lightheaded", not as though the room is spinning. She felt as though she was going to pass out prior to her episode today, but denies CP, SOB, blurry vision or focal weakness prior to the episode. She has otherwise been in her usual state of health. She follows with Dr. Frausto for cardiology, but cannot recall her last echo or stress test. She notes a prior episode of near syncope many years ago, none since. She denies allergies to medications, denies smoking, EtOH or illicit drugs. She states she went out to eat recently and is afraid she may have contracted something. She had a COVID test done 3 days ago, but was told 5-7 days for the results. At this time, patient at baseline.  Labs and imaging reviewed with patient, cardiology consult appreciated.  Patient re-assessed feeling well, able to ambulate on her own with no gait abnormalities. Given copies of all results, verbalized understanding, understands and agrees to proceed.

## 2020-11-24 NOTE — ED CDU PROVIDER SUBSEQUENT DAY NOTE - PMH
Colon cancer    Elevated cholesterol    HTN (hypertension)    PAF (paroxysmal atrial fibrillation)    Seizure

## 2020-11-24 NOTE — ED CDU PROVIDER INITIAL DAY NOTE - PSH
History of loop recorder    S/P colon resection    Status post primary angioplasty with coronary stent

## 2020-11-24 NOTE — ED CDU PROVIDER DISPOSITION NOTE - PATIENT PORTAL LINK FT
You can access the FollowMyHealth Patient Portal offered by [facility] by registering at the following website: [email or web address]. By joining [facility]’s FollowMyHealth portal, you will also be able to view your health information using other applications (apps) compatible with our system.

## 2020-11-24 NOTE — ED CDU PROVIDER DISPOSITION NOTE - CARE PROVIDER_API CALL
Kayla Berry  CARDIOVASCULAR DISEASE  [address], [address]  [place] [address]  Phone: [phone]  Fax: [phone]  Follow Up Time:

## 2020-11-24 NOTE — ED CDU PROVIDER INITIAL DAY NOTE - OBJECTIVE STATEMENT
69 y/o M with PMH HTN, HLD, PAF (on Eliquis), seizure d/o presents complaining of a near-syncopal episode that occurred this morning. She describes URI symptoms for the past 5 days of sinus congestion, rhinorrhea and feeling "generally weak". Today she had a near syncopal episode, fell to the floor, denies complete LOC, fall was witnessed by her , unsure if she hit her head. She describes since the episode that she is feeling "lightheaded", not as though the room is spinning. She felt as though she was going to pass out prior to her episode today, but denies CP, SOB, blurry vision or focal weakness prior to the episode. She has otherwise been in her usual state of health. She follows with Dr. Frausto for cardiology, but cannot recall her last echo or stress test. She notes a prior episode of near syncope many years ago, none since. She denies allergies to medications, denies smoking, EtOH or illicit drugs. She states she went out to eat recently and is afraid she may have contracted something. She had a COVID test done 3 days ago, but was told 5-7 days for the results. At this time, patient at baseline.

## 2020-11-24 NOTE — ED CDU PROVIDER DISPOSITION NOTE - ATTENDING CONTRIBUTION TO CARE
71 y/o M with PMH HTN, HLD, PAF (on Eliquis), seizure d/o presents complaining of a near-syncopal episode  negative w/up  -dc home with outpatient f/up with pmd and cardiology

## 2020-11-24 NOTE — ED CDU PROVIDER SUBSEQUENT DAY NOTE - HISTORY
No pertinent interval history. Overnight patient bradycardic, most recent BP elevated. Received no calls by RN overnight.

## 2020-11-24 NOTE — CONSULT NOTE ADULT - SUBJECTIVE AND OBJECTIVE BOX
[facility], [facility]                                                    [address], [address], [place] [address]                                                         PHONE: [phone]    FAX: [phone] [address], [address], [place] [address]                                                 PHONE: [phone]    FAX: [phone]  *******************************************************************************  cc: near syncope    HPI: 70 F with near syncope. Pt fell to the ground but did not lose consciousness. Episode witnessed by her .  Hx of HTN, HL, PAF and seizure disorder.       Overnight events/Subjective Assessment:    INTERPRETATION OF TELEMETRY (personally reviewed):    PAST MEDICAL & SURGICAL HISTORY:  Colon cancer    Elevated cholesterol    PAF (paroxysmal atrial fibrillation)    Seizure    HTN (hypertension)    History of loop recorder    Status post primary angioplasty with coronary stent    S/P colon resection        No Known Allergies      MEDICATIONS  (STANDING):  aMIOdarone    Tablet 200 milliGRAM(s) Oral two times a day  apixaban 5 milliGRAM(s) Oral every 12 hours  aspirin  chewable 81 milliGRAM(s) Oral daily  atorvastatin 40 milliGRAM(s) Oral at bedtime  lamoTRIgine 25 milliGRAM(s) Oral two times a day  levothyroxine 150 MICROGram(s) Oral daily  magnesium oxide 400 milliGRAM(s) Oral daily  pantoprazole    Tablet 40 milliGRAM(s) Oral before breakfast  valsartan 40 milliGRAM(s) Oral daily    MEDICATIONS  (PRN):      Vital Signs Last 24 Hrs  T(C): 36.4 (24 Nov 2020 03:38), Max: 36.6 (23 Nov 2020 16:18)  T(F): 97.6 (24 Nov 2020 03:38), Max: 97.9 (23 Nov 2020 23:37)  HR: 48 (24 Nov 2020 05:20) (48 - 58)  BP: 165/72 (24 Nov 2020 05:20) (126/71 - 199/98)  BP(mean): --  RR: 16 (24 Nov 2020 05:20) (15 - 18)  SpO2: 98% (24 Nov 2020 05:20) (97% - 98%)    I&O's Detail    I&O's Summary          PHYSICAL EXAM:  General: Appears well developed, well nourished, no acute distress. not in acute pain  HEAD: normal cephalic. Atraumatic  PUPILS: equal and reactive to light  EARS: normal hearing  NECK: supple. no JVD or HJR. no carotid bruits. no visible lymphadenopathy  NOSE: no gross abnormalities  CHEST: symmetric chest wall expansion  CARDIOVASCULAR: Normal rate. Regular rhythm. Normal S1 and S2, no S3/S4,  no murmur, rub, or gallop  LUNGS: Normal effort. Normal respiratory rate. Breath sounds are clear to auscultation bilaterally. No respiratory distress. No stridor.  no rales, rhonchi or wheeze. no decreased Breath sounds  ABDOMEN: Soft, nontender, non-distended, positive bowel sounds, no mass or bruit. no abdominal tenderness. No rebound. no ascites  EXTREMITIES: No clubbing, cyanosis or edema. normal range of motion  PULSES:  distal pulses WNL  SKIN: Warm and dry with normal turgor. no visible rash or cyanosis   NEURO: Alert & oriented x 3, grossly intact with no focal weakness  PSYCH: normal mood and affect. Grossly normal insight and judgement exhibited    FAMILY HISTORY:  No pertinent family history in first degree relatives        SOCIAL HISTORY:   active smoking. No ETOH/No IVDA    REVIEW OF SYSTEMS:  Constitutional: no fever, chills or malaise. No weight loss  Head: no trauma  Eyes: no visual deficit. No double vision  Ears: no hearing deficit or ringing in the ears  Nose: no nose bleeds or smell changes or congestion  Throat: no difficult swallowing or painful swallowing  Neck: supple. No lymphadenopathy or swelling  Respiratory: no SOB, wheeze, asthma, COPD. No cough. No blood in the sputum  Cardiovascular: no CP, palpitations, irregular heart beats. No edema. No PND. No orthopnea. No skin/temperature or color changes  Gastrointestinal: no abdominal pain. No constipation. No diarrhea. No melena. No nausea. No vomiting. No bloating  Genitourinary: no frequency or urgency. No hematuria  Lymphatics: no grossly swollen lymph nodes  Musculoskeletal: no limitation of range of motion. Normal strength. No pain  Integumentary: no visible rash. No itching  Neurologic: no HA. No TIA or stroke symptoms. No seizure. No hx of epilepsy. No tingling or numbness. No weakness. No dizziness  Psychiatric: denied. Reports appropriate mood.        LABS:                        13.7   5.14  )-----------( 204      ( 23 Nov 2020 19:15 )             41.1     11-23    135  |  99  |  18.0  ----------------------------<  104<H>  4.3   |  23.0  |  1.26    Ca    9.2      23 Nov 2020 19:15    TPro  7.2  /  Alb  4.1  /  TBili  0.3<L>  /  DBili  x   /  AST  40<H>  /  ALT  35<H>  /  AlkPhos  46  11-23    CARDIAC MARKERS ( 24 Nov 2020 01:34 )  x     / <0.01 ng/mL / x     / x     / x      CARDIAC MARKERS ( 23 Nov 2020 19:15 )  x     / <0.01 ng/mL / x     / x     / x          PT/INR - ( 23 Nov 2020 19:15 )   PT: 12.8 sec;   INR: 1.11 ratio         PTT - ( 23 Nov 2020 19:15 )  PTT:31.4 sec  serum  Lipids:         RADIOLOGY & ADDITIONAL STUDIES:    ECG: SB 54. ICRBBB. no acute changes    < from: CT Head No Cont (11.23.20 @ 18:30) >  IMPRESSION:    No acute intracranial findings.    < end of copied text >    < from: Xray Chest 2 Views PA/Lat (11.23.20 @ 18:56) >  IMPRESSION:  Large retrocardiac hiatal hernia. Cardiomegaly.  Lungs clear.    < end of copied text >      ASSESSMENT AND PLAN:  In summary, GARIMA BECKETT is a 70y Female with past medical history significant for       Kayla Berry MD [facility], [facility]                                                    [address], [address], [place] [address]                                                         PHONE: [phone]    FAX: [phone] [address], [address], [place] [address]                                                 PHONE: [phone]    FAX: [phone]  *******************************************************************************  cc: near syncope    HPI: 70 F with near syncope. Pt fell to the ground but did not lose consciousness. Episode witnessed by her . Pt reports she had gotten up to let the dog out. She did not feel well so she thought she would get fresh air.  She fell to the ground without LOC. No CP, SOB, palpitations, PND or orthopnea. Did not feel dizziness before the episode. Pt has ILR.  Hx of HTN, HL, PAF and seizure disorder.       Overnight events/Subjective Assessment: feels improved    INTERPRETATION OF TELEMETRY (personally reviewed): SR    PAST MEDICAL & SURGICAL HISTORY:  Colon cancer    Elevated cholesterol    PAF (paroxysmal atrial fibrillation)    Seizure    HTN (hypertension)    History of loop recorder    Status post primary angioplasty with coronary stent    S/P colon resection        No Known Allergies      MEDICATIONS  (STANDING):  aMIOdarone    Tablet 200 milliGRAM(s) Oral two times a day  apixaban 5 milliGRAM(s) Oral every 12 hours  aspirin  chewable 81 milliGRAM(s) Oral daily  atorvastatin 40 milliGRAM(s) Oral at bedtime  lamoTRIgine 25 milliGRAM(s) Oral two times a day  levothyroxine 150 MICROGram(s) Oral daily  magnesium oxide 400 milliGRAM(s) Oral daily  pantoprazole    Tablet 40 milliGRAM(s) Oral before breakfast  valsartan 40 milliGRAM(s) Oral daily    MEDICATIONS  (PRN):      Vital Signs Last 24 Hrs  T(C): 36.4 (24 Nov 2020 03:38), Max: 36.6 (23 Nov 2020 16:18)  T(F): 97.6 (24 Nov 2020 03:38), Max: 97.9 (23 Nov 2020 23:37)  HR: 48 (24 Nov 2020 05:20) (48 - 58)  BP: 165/72 (24 Nov 2020 05:20) (126/71 - 199/98)  BP(mean): --  RR: 16 (24 Nov 2020 05:20) (15 - 18)  SpO2: 98% (24 Nov 2020 05:20) (97% - 98%)    I&O's Detail    I&O's Summary          PHYSICAL EXAM:  General: Appears well developed, well nourished, no acute distress. not in acute pain  HEAD: normal cephalic. Atraumatic  PUPILS: equal and reactive to light  EARS: normal hearing  NECK: supple. no JVD or HJR. no carotid bruits. no visible lymphadenopathy  NOSE: no gross abnormalities  CHEST: symmetric chest wall expansion  CARDIOVASCULAR: Normal rate. Regular rhythm. Normal S1 and S2, no S3/S4,  no murmur, rub, or gallop  LUNGS: Normal effort. Normal respiratory rate. Breath sounds are clear to auscultation bilaterally. No respiratory distress. No stridor.  no rales, rhonchi or wheeze. no decreased Breath sounds  ABDOMEN: Soft, nontender, non-distended, positive bowel sounds, no mass or bruit. no abdominal tenderness. No rebound. no ascites  EXTREMITIES: No clubbing, cyanosis or edema. normal range of motion  PULSES:  distal pulses WNL  SKIN: Warm and dry with normal turgor. no visible rash or cyanosis   NEURO: Alert & oriented x 3, grossly intact with no focal weakness  PSYCH: normal mood and affect. Grossly normal insight and judgement exhibited    FAMILY HISTORY:  Father with an MI in her 40's. No family hx of ischemic heart disease for her father or other 1st degree relatives        SOCIAL HISTORY:  no active smoking. No ETOH/No IVDA    REVIEW OF SYSTEMS:  Constitutional: no fever, chills or malaise. No weight loss  Head: no trauma  Eyes: no visual deficit. No double vision  Ears: no hearing deficit or ringing in the ears  Nose: no nose bleeds or smell changes or congestion  Throat: no difficult swallowing or painful swallowing  Neck: supple. No lymphadenopathy or swelling  Respiratory: no SOB, wheeze, asthma, COPD. No cough. No blood in the sputum  Cardiovascular: no CP, palpitations, irregular heart beats. No edema. No PND. No orthopnea. No skin/temperature or color changes  Gastrointestinal: no abdominal pain. No constipation. No diarrhea. No melena. No nausea. No vomiting. No bloating  Genitourinary: no frequency or urgency. No hematuria  Lymphatics: no grossly swollen lymph nodes  Musculoskeletal: no limitation of range of motion. Normal strength. No pain  Integumentary: no visible rash. No itching  Neurologic: no HA. No TIA or stroke symptoms. No seizure. No hx of epilepsy. No tingling or numbness. No weakness. No dizziness  Psychiatric: denied. Reports appropriate mood.        LABS:                        13.7   5.14  )-----------( 204      ( 23 Nov 2020 19:15 )             41.1     11-23    135  |  99  |  18.0  ----------------------------<  104<H>  4.3   |  23.0  |  1.26    Ca    9.2      23 Nov 2020 19:15    TPro  7.2  /  Alb  4.1  /  TBili  0.3<L>  /  DBili  x   /  AST  40<H>  /  ALT  35<H>  /  AlkPhos  46  11-23    CARDIAC MARKERS ( 24 Nov 2020 01:34 )  x     / <0.01 ng/mL / x     / x     / x      CARDIAC MARKERS ( 23 Nov 2020 19:15 )  x     / <0.01 ng/mL / x     / x     / x          PT/INR - ( 23 Nov 2020 19:15 )   PT: 12.8 sec;   INR: 1.11 ratio         PTT - ( 23 Nov 2020 19:15 )  PTT:31.4 sec  serum  Lipids:         RADIOLOGY & ADDITIONAL STUDIES:    ECG: SB 54. ICRBBB. no acute changes    < from: CT Head No Cont (11.23.20 @ 18:30) >  IMPRESSION:    No acute intracranial findings.    < end of copied text >    < from: Xray Chest 2 Views PA/Lat (11.23.20 @ 18:56) >  IMPRESSION:  Large retrocardiac hiatal hernia. Cardiomegaly.  Lungs clear.    < end of copied text >      ASSESSMENT AND PLAN:  In summary, GARIMA BECKETT is a 70y Female with past medical history significant for near syncope. Pt fell to the ground but did not lose consciousness. Episode witnessed by her . Pt reports she had gotten up to let the dog out. She did not feel well so she thought she would get fresh air.  She fell to the ground without LOC. No CP, SOB, palpitations, PND or orthopnea. Did not feel dizziness before the episode. Pt has ILR.  Hx of HTN, HL, PAF and seizure disorder.     - Near syncope.  Pt with ILR. Will interrogate ILR.    - check orthostatics    - Hx of seizure disorder. As per medical    - HTN. Maintain valsartan 40mg daily    - HL  maintains atorvastatin 40mg daily    - CAD. Non transmural MI 8/'15 with PRITESH to the LAD and LCx. Cardiac enzymes negative x 2. No sx to suggest ACS or hemodynamic compromise.     - Nuclear stress test 8/10/19 no ischemia. Ef 74%    - PAF diagnosed 5/'17. Maintaining SR on amiodarone.  Eliquis in place due to elevated [id number] score.    - Echo 2/12/20 EF 55-60%. mild AS,. mild valvular disease. no need to repeat    - Carotid 2/12/20 16-49% right, 50-65% left    - Telemetry monitoring personally reviewed by me. SR    - ECG personally reviewed by me    - Echocardiogram as above. no need to repeat.    - radiologic imaging reviewed    - Laboratory data reviewed.    - I personally spoke with nursing in the ER    - I have personally reviewed all obtainable prior records and data    Kayla Berry MD [facility], [facility]                                                    [address], [place] [address]                                                         PHONE: [phone]    FAX: [phone] [address], [address], [place] [address]                                                 PHONE: [phone]    FAX: [phone]  *******************************************************************************  cc: near syncope    HPI: 70 F with near syncope. Pt fell to the ground but did not lose consciousness. Episode witnessed by her . Pt reports she had gotten up to let the dog out. She did not feel well so she thought she would get fresh air.  She fell to the ground without LOC. No CP, SOB, palpitations, PND or orthopnea. Did not feel dizziness before the episode. Pt has ILR.  Hx of HTN, HL, PAF and seizure disorder.       Overnight events/Subjective Assessment: feels improved    INTERPRETATION OF TELEMETRY (personally reviewed): SR    PAST MEDICAL & SURGICAL HISTORY:  Colon cancer    Elevated cholesterol    PAF (paroxysmal atrial fibrillation)    Seizure    HTN (hypertension)    History of loop recorder    Status post primary angioplasty with coronary stent    S/P colon resection        No Known Allergies      MEDICATIONS  (STANDING):  aMIOdarone    Tablet 200 milliGRAM(s) Oral two times a day  apixaban 5 milliGRAM(s) Oral every 12 hours  aspirin  chewable 81 milliGRAM(s) Oral daily  atorvastatin 40 milliGRAM(s) Oral at bedtime  lamoTRIgine 25 milliGRAM(s) Oral two times a day  levothyroxine 150 MICROGram(s) Oral daily  magnesium oxide 400 milliGRAM(s) Oral daily  pantoprazole    Tablet 40 milliGRAM(s) Oral before breakfast  valsartan 40 milliGRAM(s) Oral daily    MEDICATIONS  (PRN):      Vital Signs Last 24 Hrs  T(C): 36.4 (24 Nov 2020 03:38), Max: 36.6 (23 Nov 2020 16:18)  T(F): 97.6 (24 Nov 2020 03:38), Max: 97.9 (23 Nov 2020 23:37)  HR: 48 (24 Nov 2020 05:20) (48 - 58)  BP: 165/72 (24 Nov 2020 05:20) (126/71 - 199/98)  BP(mean): --  RR: 16 (24 Nov 2020 05:20) (15 - 18)  SpO2: 98% (24 Nov 2020 05:20) (97% - 98%)    I&O's Detail    I&O's Summary          PHYSICAL EXAM:  General: Appears well developed, well nourished, no acute distress. not in acute pain  HEAD: normal cephalic. Atraumatic  PUPILS: equal and reactive to light  EARS: normal hearing  NECK: supple. no JVD or HJR. no carotid bruits. no visible lymphadenopathy  NOSE: no gross abnormalities  CHEST: symmetric chest wall expansion  CARDIOVASCULAR: Normal rate. Regular rhythm. Normal S1 and S2, no S3/S4,  no murmur, rub, or gallop  LUNGS: Normal effort. Normal respiratory rate. Breath sounds are clear to auscultation bilaterally. No respiratory distress. No stridor.  no rales, rhonchi or wheeze. no decreased Breath sounds  ABDOMEN: Soft, nontender, non-distended, positive bowel sounds, no mass or bruit. no abdominal tenderness. No rebound. no ascites  EXTREMITIES: No clubbing, cyanosis or edema. normal range of motion  PULSES:  distal pulses WNL  SKIN: Warm and dry with normal turgor. no visible rash or cyanosis   NEURO: Alert & oriented x 3, grossly intact with no focal weakness  PSYCH: normal mood and affect. Grossly normal insight and judgement exhibited    FAMILY HISTORY:  Father with an MI in her 40's. No family hx of ischemic heart disease for her father or other 1st degree relatives        SOCIAL HISTORY:  no active smoking. No ETOH/No IVDA    REVIEW OF SYSTEMS:  Constitutional: no fever, chills or malaise. No weight loss  Head: no trauma  Eyes: no visual deficit. No double vision  Ears: no hearing deficit or ringing in the ears  Nose: no nose bleeds or smell changes or congestion  Throat: no difficult swallowing or painful swallowing  Neck: supple. No lymphadenopathy or swelling  Respiratory: no SOB, wheeze, asthma, COPD. No cough. No blood in the sputum  Cardiovascular: no CP, palpitations, irregular heart beats. No edema. No PND. No orthopnea. No skin/temperature or color changes  Gastrointestinal: no abdominal pain. No constipation. No diarrhea. No melena. No nausea. No vomiting. No bloating  Genitourinary: no frequency or urgency. No hematuria  Lymphatics: no grossly swollen lymph nodes  Musculoskeletal: no limitation of range of motion. Normal strength. No pain  Integumentary: no visible rash. No itching  Neurologic: no HA. No TIA or stroke symptoms. No seizure. No hx of epilepsy. No tingling or numbness. No weakness. No dizziness  Psychiatric: denied. Reports appropriate mood.        LABS:                        13.7   5.14  )-----------( 204      ( 23 Nov 2020 19:15 )             41.1     11-23    135  |  99  |  18.0  ----------------------------<  104<H>  4.3   |  23.0  |  1.26    Ca    9.2      23 Nov 2020 19:15    TPro  7.2  /  Alb  4.1  /  TBili  0.3<L>  /  DBili  x   /  AST  40<H>  /  ALT  35<H>  /  AlkPhos  46  11-23    CARDIAC MARKERS ( 24 Nov 2020 01:34 )  x     / <0.01 ng/mL / x     / x     / x      CARDIAC MARKERS ( 23 Nov 2020 19:15 )  x     / <0.01 ng/mL / x     / x     / x          PT/INR - ( 23 Nov 2020 19:15 )   PT: 12.8 sec;   INR: 1.11 ratio         PTT - ( 23 Nov 2020 19:15 )  PTT:31.4 sec  serum  Lipids:         RADIOLOGY & ADDITIONAL STUDIES:    ECG: SB 54. ICRBBB. no acute changes    < from: CT Head No Cont (11.23.20 @ 18:30) >  IMPRESSION:    No acute intracranial findings.    < end of copied text >    < from: Xray Chest 2 Views PA/Lat (11.23.20 @ 18:56) >  IMPRESSION:  Large retrocardiac hiatal hernia. Cardiomegaly.  Lungs clear.    < end of copied text >      ASSESSMENT AND PLAN:  In summary, GARIMA BECKETT is a 70y Female with past medical history significant for near syncope. Pt fell to the ground but did not lose consciousness. Episode witnessed by her . Pt reports she had gotten up to let the dog out. She did not feel well so she thought she would get fresh air.  She fell to the ground without LOC. No CP, SOB, palpitations, PND or orthopnea. Did not feel dizziness before the episode. Pt has ILR.  Hx of HTN, HL, PAF and seizure disorder.     - Near syncope.  Pt with ILR. Will interrogate ILR.    - check orthostatics    - Hx of seizure disorder. As per medical    - HTN. Maintain valsartan 40mg daily    - HL  maintains atorvastatin 40mg daily    - CAD. Non transmural MI 8/'15 with PRITESH to the LAD and LCx. Cardiac enzymes negative x 2. No sx to suggest ACS or hemodynamic compromise.     - Nuclear stress test 8/10/19 no ischemia. Ef 74%    - PAF diagnosed 5/'17. Maintaining SR on amiodarone.  Eliquis in place due to elevated [id number] score.    - Echo 2/12/20 EF 55-60%. mild AS,. mild valvular disease. no need to repeat    - Carotid 2/12/20 16-49% right, 50-65% left    - Telemetry monitoring personally reviewed by me. SR    - ECG personally reviewed by me    - Echocardiogram as above. no need to repeat.    - radiologic imaging reviewed    - Laboratory data reviewed.    - I personally spoke with nursing in the ER    - I have personally reviewed all obtainable prior records and data    - ILR transmission 11/23 was normal. Bitex.latronic to assess ILR for any findings s/p transmission and leading to presentation    Kayla Berry MD

## 2020-11-24 NOTE — ED CDU PROVIDER INITIAL DAY NOTE - PMH
Elevated cholesterol    HTN (hypertension)    PAF (paroxysmal atrial fibrillation)    Seizure     Colon cancer    Elevated cholesterol    HTN (hypertension)    PAF (paroxysmal atrial fibrillation)    Seizure

## 2020-11-25 RX ORDER — CEPHALEXIN 500 MG
1 CAPSULE ORAL
Qty: 14 | Refills: 0
Start: 2020-11-25 | End: 2020-12-01

## 2020-11-25 NOTE — ED POST DISCHARGE NOTE - DETAILS
Spoke to patient on phone regarding results, advised to take abx, patient verbalized understanding and agreement of plan.

## 2021-01-04 PROBLEM — I10 ESSENTIAL (PRIMARY) HYPERTENSION: Chronic | Status: ACTIVE | Noted: 2020-11-23

## 2021-01-04 PROBLEM — R56.9 UNSPECIFIED CONVULSIONS: Chronic | Status: ACTIVE | Noted: 2020-11-23

## 2021-01-04 PROBLEM — C18.9 MALIGNANT NEOPLASM OF COLON, UNSPECIFIED: Chronic | Status: ACTIVE | Noted: 2020-11-24

## 2021-01-04 PROBLEM — E78.00 PURE HYPERCHOLESTEROLEMIA, UNSPECIFIED: Chronic | Status: ACTIVE | Noted: 2020-11-23

## 2021-01-04 PROBLEM — I48.0 PAROXYSMAL ATRIAL FIBRILLATION: Chronic | Status: ACTIVE | Noted: 2020-11-23

## 2021-01-12 ENCOUNTER — APPOINTMENT (OUTPATIENT)
Dept: NEUROLOGY | Facility: CLINIC | Age: 71
End: 2021-01-12
Payer: MEDICARE

## 2021-01-12 VITALS
SYSTOLIC BLOOD PRESSURE: 130 MMHG | DIASTOLIC BLOOD PRESSURE: 78 MMHG | TEMPERATURE: 97.5 F | HEIGHT: 65 IN | BODY MASS INDEX: 26.66 KG/M2 | WEIGHT: 160 LBS

## 2021-01-12 DIAGNOSIS — R55 SYNCOPE AND COLLAPSE: ICD-10-CM

## 2021-01-12 PROCEDURE — 99214 OFFICE O/P EST MOD 30 MIN: CPT

## 2021-01-12 NOTE — HISTORY OF PRESENT ILLNESS
[FreeTextEntry1] : 10/6/17:\par This is a 67-year-old woman follows up today with seizure disorder. She is currently taking Lamictal 75 mg twice a day. She is stable without any evidence of seizure activity. When asked she hasn't had any evidence of rash. She did have an episode of atrial fibrillation in June. She was started Eliquis as well as amiodarone for this. She is doing well otherwise. She is here today for routine neurologic followup.\par \par Followup June 26, 2018:\par This is a 68-year-old woman follows up today with seizure disorder. She is also had new complaints of an episode of syncope, headache as well as leg swelling. Regarding her seizures she is stable on lamotrigine 75 mg twice a day. She does not have any seizures since being seen last October however she did have an episode of syncope. She states this was in the setting of stress and dehydration when her  was in the hospital for surgery. She felt as if she was going to collapse and did. She was given orange juice and liquids and then felt better. Subsequent to this she had an episode of dull headache. It started in the occiput and radiated up to the front. She was treated with a Medrol Dosepak and the headache was relieved. Lastly she just returned from a trip to [place] and has bilateral left greater than right leg swelling. She states her leg swelling started while in [place] and she attempted to walking. She is on anticoagulation with Eliquis for her atrial fibrillation. She is here today for neurologic followup and to address these new symptoms.\par \par Followup October 2, 2018:\par This is a 68-year-old woman who presents today for followup of seizure disorder. She's not had any seizures recently. She is maintained on Lamictal 75 mg twice daily. She's taking the medication as directed without side effects. She's overall doing quite well. She is here today for routine neurologic followup.\par \par Followup May 7, 2019:\par This is a 69-year-old woman who presents today for followup of seizure disorder. She is currently taking Lamictal 75 mg twice daily. She has not had any seizures. She tolerates the medication well. She is not missing doses. She's overall doing very well from a seizure point of view. She is here today for routine neurologic followup.\par \par Followup December 17, 2019:\par This is a 69-year-old woman who presents today for followup of seizure disorder. She is currently stable on Lamictal 75 mg twice a day. She is not having any side effects to the medication. She reports excellent compliance. She is here today for routine followup.\par \par Followup January 12, 2021:\par This is a 70-year-old woman who presents today for neurologic followup of seizure disorder. She also had episode of syncope. She went to the hospital in November 2020 for an episode where she felt funny, lightheaded and collapsed without clear loss of consciousness. She went to the hospital at the time of presentation she was hypertensive. Her blood pressure was managed and she was evaluated by cardiology who stated she had no cardiac abnormalities, interrogation of her loop recorder did not reveal any arrhythmia per the notes reviewed. She had a head CT which I reviewed which did not show acute stroke mass or bleed. She was discharged and then subsequently a few weeks later had another syncopal event however this time she states her  measure her blood pressure to be 63/48. There was urinary incontinence associated with this. She lost consciousness for a brief moment. There was no clear convulsive activity. She continues to take Lamictal 75 mg twice daily. She is here today for neurologic followup and treatment of these events.

## 2021-01-12 NOTE — REVIEW OF SYSTEMS
[As Noted in HPI] : as noted in HPI [Fainting] : fainting [Lightheadedness] : lightheadedness [Negative] : Heme/Lymph

## 2021-01-12 NOTE — PHYSICAL EXAM
[General Appearance - Alert] : alert [General Appearance - In No Acute Distress] : in no acute distress [General Appearance - Well Nourished] : well nourished [General Appearance - Well Developed] : well developed [Person] : oriented to person [Place] : oriented to place [Time] : oriented to time [Remote Intact] : remote memory intact [Registration Intact] : recent registration memory intact [Span Intact] : the attention span was normal [Concentration Intact] : normal concentrating ability [Visual Intact] : visual attention was ~T not ~L decreased [Naming Objects] : no difficulty naming common objects [Repeating Phrases] : no difficulty repeating a phrase [Fluency] : fluency intact [Comprehension] : comprehension intact [Current Events] : adequate knowledge of current events [Past History] : adequate knowledge of personal past history [Cranial Nerves Optic (II)] : visual acuity intact bilaterally,  visual fields full to confrontation, pupils equal round and reactive to light [Cranial Nerves Oculomotor (III)] : extraocular motion intact [Cranial Nerves Trigeminal (V)] : facial sensation intact symmetrically [Cranial Nerves Facial (VII)] : face symmetrical [Cranial Nerves Vestibulocochlear (VIII)] : hearing was intact bilaterally [Cranial Nerves Glossopharyngeal (IX)] : tongue and palate midline [Cranial Nerves Accessory (XI - Cranial And Spinal)] : head turning and shoulder shrug symmetric [Cranial Nerves Hypoglossal (XII)] : there was no tongue deviation with protrusion [Motor Tone] : muscle tone was normal in all four extremities [Motor Strength] : muscle strength was normal in all four extremities [Involuntary Movements] : no involuntary movements were seen [No Muscle Atrophy] : normal bulk in all four extremities [Motor Handedness Right-Handed] : the patient is right hand dominant [Paresis Pronator Drift Right-Sided] : no pronator drift on the right [Paresis Pronator Drift Left-Sided] : no pronator drift on the left [Motor Strength Upper Extremities Bilaterally] : strength was normal in both upper extremities [Motor Strength Lower Extremities Bilaterally] : strength was normal in both lower extremities [Sensation Tactile Decrease] : light touch was intact [Sensation Pain / Temperature Decrease] : pain and temperature was intact [Sensation Vibration Decrease] : vibration was intact [Proprioception] : proprioception was intact [Abnormal Walk] : normal gait [Balance] : balance was intact [Tremor] : no tremor present [Coordination - Dysmetria Impaired Finger-to-Nose Bilateral] : not present [2+] : Patella left 2+ [Sclera] : the sclera and conjunctiva were normal [PERRL With Normal Accommodation] : pupils were equal in size, round, reactive to light, with normal accommodation [Extraocular Movements] : extraocular movements were intact [No APD] : no afferent pupillary defect [No DREA] : no internuclear ophthalmoplegia [Full Visual Field] : full visual field

## 2021-01-12 NOTE — CONSULT LETTER
[Dear  ___] : Dear  [unfilled], [Courtesy Letter:] : I had the pleasure of seeing your patient, [unfilled], in my office today. [Please see my note below.] : Please see my note below. [Consult Closing:] : Thank you very much for allowing me to participate in the care of this patient.  If you have any questions, please do not hesitate to contact me. [Sincerely,] : Sincerely, [FreeTextEntry3] : Mauro Barboza M.D., Ph.D. DPN-N\par [facility]\par Neurology at [place]\par Medical Director of Stroke Services\par [facility]\par

## 2021-01-12 NOTE — DATA REVIEWED
[de-identified] : I reviewed the CT head from November 2020 from [facility]. Did not show evidence for acute stroke mass or bleed. There was mild small vessel ischemic changes present.\par \par I reviewed the notes from both ER medical team as well as cardiology from her hospitalization in November of 2020 .

## 2021-01-12 NOTE — ASSESSMENT
[FreeTextEntry1] : This is a 70-year-old woman with history of seizure who had 2 events of presyncope and syncope. The second event was accompanied with urinary incontinence but not convulsions.\par \par Seizure: It's not clear whether or not these events represented seizure activity versus a syncopal activity. I would like to do an EEG to evaluate. She still has some lightheadedness at times. For now she will continue Lamictal 75 mg twice a day. If her EEG shows abnormal activity her limits will need to be titrated higher.\par \par Syncope: Cardiology had cleared her from their perspective. The second event was associated with significantly low blood pressure in the first event she was hypertensive in the ER but it's not clear what her blood pressure was at the time of the event at home. She will continue her blood pressure medications and I've asked her to monitor her blood pressure at home and if it does continue to have these drops that lead to dizziness she may need to followup for stricter blood pressure management.\par \par I will call her with the test results and inform her of any changes to her medications that might be necessary based on it. I will see her in the office in one year or sooner should the need arise. She is asked to call me in the interim if any problems, questions or concerns.

## 2021-01-13 ENCOUNTER — APPOINTMENT (OUTPATIENT)
Dept: NEUROLOGY | Facility: CLINIC | Age: 71
End: 2021-01-13
Payer: MEDICARE

## 2021-01-13 ENCOUNTER — NON-APPOINTMENT (OUTPATIENT)
Age: 71
End: 2021-01-13

## 2021-01-13 PROCEDURE — 95819 EEG AWAKE AND ASLEEP: CPT

## 2021-01-13 PROCEDURE — 93040 RHYTHM ECG WITH REPORT: CPT

## 2021-01-15 ENCOUNTER — NON-APPOINTMENT (OUTPATIENT)
Age: 71
End: 2021-01-15

## 2021-02-22 ENCOUNTER — RX CHANGE (OUTPATIENT)
Age: 71
End: 2021-02-22

## 2021-02-22 RX ORDER — LAMOTRIGINE 100 MG/1
100 TABLET ORAL TWICE DAILY
Qty: 60 | Refills: 5 | Status: DISCONTINUED | COMMUNITY
Start: 2021-01-31 | End: 2021-02-22

## 2021-06-04 ENCOUNTER — TRANSCRIPTION ENCOUNTER (OUTPATIENT)
Age: 71
End: 2021-06-04

## 2021-07-12 ENCOUNTER — APPOINTMENT (OUTPATIENT)
Dept: NEUROLOGY | Facility: CLINIC | Age: 71
End: 2021-07-12

## 2022-01-26 ENCOUNTER — APPOINTMENT (OUTPATIENT)
Dept: NEUROLOGY | Facility: CLINIC | Age: 72
End: 2022-01-26
Payer: MEDICARE

## 2022-01-26 VITALS
HEIGHT: 65 IN | DIASTOLIC BLOOD PRESSURE: 78 MMHG | BODY MASS INDEX: 26.66 KG/M2 | WEIGHT: 160 LBS | SYSTOLIC BLOOD PRESSURE: 118 MMHG

## 2022-01-26 PROCEDURE — 99213 OFFICE O/P EST LOW 20 MIN: CPT

## 2022-01-26 NOTE — HISTORY OF PRESENT ILLNESS
[FreeTextEntry1] : 10/6/17:\par This is a 67-year-old woman follows up today with seizure disorder. She is currently taking Lamictal 75 mg twice a day. She is stable without any evidence of seizure activity. When asked she hasn't had any evidence of rash. She did have an episode of atrial fibrillation in June. She was started Eliquis as well as amiodarone for this. She is doing well otherwise. She is here today for routine neurologic followup.\par \par Followup June 26, 2018:\par This is a 68-year-old woman follows up today with seizure disorder. She is also had new complaints of an episode of syncope, headache as well as leg swelling. Regarding her seizures she is stable on lamotrigine 75 mg twice a day. She does not have any seizures since being seen last October however she did have an episode of syncope. She states this was in the setting of stress and dehydration when her  was in the hospital for surgery. She felt as if she was going to collapse and did. She was given orange juice and liquids and then felt better. Subsequent to this she had an episode of dull headache. It started in the occiput and radiated up to the front. She was treated with a Medrol Dosepak and the headache was relieved. Lastly she just returned from a trip to [place] and has bilateral left greater than right leg swelling. She states her leg swelling started while in [place] and she attempted to walking. She is on anticoagulation with Eliquis for her atrial fibrillation. She is here today for neurologic followup and to address these new symptoms.\par \par Followup October 2, 2018:\par This is a 68-year-old woman who presents today for followup of seizure disorder. She's not had any seizures recently. She is maintained on Lamictal 75 mg twice daily. She's taking the medication as directed without side effects. She's overall doing quite well. She is here today for routine neurologic followup.\par \par Followup May 7, 2019:\par This is a 69-year-old woman who presents today for followup of seizure disorder. She is currently taking Lamictal 75 mg twice daily. She has not had any seizures. She tolerates the medication well. She is not missing doses. She's overall doing very well from a seizure point of view. She is here today for routine neurologic followup.\par \par Followup December 17, 2019:\par This is a 69-year-old woman who presents today for followup of seizure disorder. She is currently stable on Lamictal 75 mg twice a day. She is not having any side effects to the medication. She reports excellent compliance. She is here today for routine followup.\par \par Followup January 12, 2021:\par This is a 70-year-old woman who presents today for neurologic followup of seizure disorder. She also had episode of syncope. She went to the hospital in November 2020 for an episode where she felt funny, lightheaded and collapsed without clear loss of consciousness. She went to the hospital at the time of presentation she was hypertensive. Her blood pressure was managed and she was evaluated by cardiology who stated she had no cardiac abnormalities, interrogation of her loop recorder did not reveal any arrhythmia per the notes reviewed. She had a head CT which I reviewed which did not show acute stroke mass or bleed. She was discharged and then subsequently a few weeks later had another syncopal event however this time she states her  measure her blood pressure to be 63/48. There was urinary incontinence associated with this. She lost consciousness for a brief moment. There was no clear convulsive activity. She continues to take Lamictal 75 mg twice daily. She is here today for neurologic followup and treatment of these events.\par \par Followup January 26, 2022:\par This is a 72-year-old woman who presents today for neurologic followup of seizure. She's had events of syncope which ordinarily not to blood pressure and amiodarone. Amiodarone has been adjusted and is evidence of stopped. She is currently taking Lamictal 100 mg twice a day. She tolerates this well and is not have seizure. She is here today for neurologic followup.

## 2022-01-26 NOTE — CONSULT LETTER
[Dear  ___] : Dear  [unfilled], [Courtesy Letter:] : I had the pleasure of seeing your patient, [unfilled], in my office today. [Please see my note below.] : Please see my note below. [Consult Closing:] : Thank you very much for allowing me to participate in the care of this patient.  If you have any questions, please do not hesitate to contact me. [Sincerely,] : Sincerely, [FreeTextEntry3] : Mauro Barboza M.D., Ph.D. DPN-N\par [facility]\par Neurology at [place]\par Medical Director of Stroke Services\par [facility]\par

## 2022-01-26 NOTE — ASSESSMENT
[FreeTextEntry1] : This is a 72-year-old woman with stable seizure disorder. She will continue Lamictal 100 mg twice daily. I will see her back in the office in one year, sooner should the need arise. I asked her clubbing the interim should she have any problems questions concerns or especially for breakthrough seizures.

## 2022-01-26 NOTE — PHYSICAL EXAM

## 2022-08-27 ENCOUNTER — NON-APPOINTMENT (OUTPATIENT)
Age: 72
End: 2022-08-27

## 2022-11-07 ENCOUNTER — OFFICE (OUTPATIENT)
Dept: URBAN - METROPOLITAN AREA CLINIC 94 | Facility: CLINIC | Age: 72
Setting detail: OPHTHALMOLOGY
End: 2022-11-07
Payer: MEDICARE

## 2022-11-07 ENCOUNTER — ASC (OUTPATIENT)
Dept: URBAN - METROPOLITAN AREA SURGERY 8 | Facility: SURGERY | Age: 72
Setting detail: OPHTHALMOLOGY
End: 2022-11-07
Payer: MEDICARE

## 2022-11-07 DIAGNOSIS — H26.492: ICD-10-CM

## 2022-11-07 DIAGNOSIS — H35.363: ICD-10-CM

## 2022-11-07 DIAGNOSIS — H16.223: ICD-10-CM

## 2022-11-07 DIAGNOSIS — H35.033: ICD-10-CM

## 2022-11-07 DIAGNOSIS — H26.493: ICD-10-CM

## 2022-11-07 PROCEDURE — 66821 AFTER CATARACT LASER SURGERY: CPT | Performed by: OPHTHALMOLOGY

## 2022-11-07 PROCEDURE — 99213 OFFICE O/P EST LOW 20 MIN: CPT | Performed by: OPHTHALMOLOGY

## 2022-11-07 ASSESSMENT — CONFRONTATIONAL VISUAL FIELD TEST (CVF)
OS_FINDINGS: FULL
OD_FINDINGS: FULL

## 2022-11-07 ASSESSMENT — SUPERFICIAL PUNCTATE KERATITIS (SPK)
OS_SPK: T 1+
OD_SPK: T 1+

## 2022-11-07 ASSESSMENT — TONOMETRY
OS_IOP_MMHG: 15
OD_IOP_MMHG: 12

## 2022-11-09 ENCOUNTER — ASC (OUTPATIENT)
Dept: URBAN - METROPOLITAN AREA SURGERY 8 | Facility: SURGERY | Age: 72
Setting detail: OPHTHALMOLOGY
End: 2022-11-09
Payer: MEDICARE

## 2022-11-09 DIAGNOSIS — H26.491: ICD-10-CM

## 2022-11-09 PROCEDURE — 66821 AFTER CATARACT LASER SURGERY: CPT | Performed by: OPHTHALMOLOGY

## 2022-11-09 ASSESSMENT — REFRACTION_MANIFEST
OS_SPHERE: -0.75
OS_AXIS: 078
OD_CYLINDER: -0.50
OS_ADD: +2.25
OS_SPHERE: -1.00
OD_VA1: 20/20
OS_CYLINDER: -0.50
OD_VA1: 20/20
OD_AXIS: 080
OD_AXIS: 080
OS_AXIS: 085
OD_ADD: +2.25
OS_SPHERE: -0.75
OS_VA1: 20/25
OS_VA1: 20/20
OD_SPHERE: 0.00
OS_AXIS: 085
OS_VA1: 20/25
OD_AXIS: 150
OS_VA1: 20/20
OS_CYLINDER: -0.50
OD_AXIS: 150
OS_VA2: 20/20
OD_VA2: 20/20
OS_AXIS: 078
OD_CYLINDER: -0.25
OS_SPHERE: -0.50
OS_CYLINDER: -0.50
OD_SPHERE: PLANO
OS_VA2: 20/20
OS_AXIS: 070
OD_CYLINDER: -0.50
OS_AXIS: 070
OS_SPHERE: -0.50
OD_VA1: 20/25
OD_SPHERE: 0.00
OS_ADD: +2.25
OS_VA1: 20/20
OS_CYLINDER: -1.00
OS_CYLINDER: -1.00
OS_VA1: 20/20
OD_ADD: +2.25
OD_VA2: 20/20
OU_VA: 20/20
OD_CYLINDER: -0.25
OS_CYLINDER: -0.50
OS_SPHERE: -1.00
OD_SPHERE: PLANO
OU_VA: 20/20
OD_VA1: 20/25

## 2022-11-09 ASSESSMENT — SPHEQUIV_DERIVED
OS_SPHEQUIV: -1
OS_SPHEQUIV: -1.5
OS_SPHEQUIV: -0.75
OD_SPHEQUIV: 0.125
OD_SPHEQUIV: 0.125
OS_SPHEQUIV: -0.75
OS_SPHEQUIV: -0.75
OS_SPHEQUIV: -1
OD_SPHEQUIV: -0.125
OS_SPHEQUIV: -1.5
OS_SPHEQUIV: -0.75
OD_SPHEQUIV: -0.125

## 2022-11-09 ASSESSMENT — AXIALLENGTH_DERIVED
OD_AL: 24.4948
OS_AL: 24.8678
OD_AL: 24.6002
OS_AL: 25.1968
OS_AL: 25.1968
OD_AL: 24.6002
OS_AL: 24.9765
OS_AL: 24.8678
OS_AL: 24.8678
OD_AL: 24.4948
OS_AL: 24.9765
OS_AL: 24.8678

## 2022-11-09 ASSESSMENT — REFRACTION_CURRENTRX
OD_SPHERE: +2.00
OS_AXIS: 059
OD_CYLINDER: 0.00
OD_AXIS: 000
OD_CYLINDER: 0.00
OS_SPHERE: +1.75
OS_VPRISM_DIRECTION: PROGS
OS_VPRISM_DIRECTION: PROGS
OD_OVR_VA: 20/
OS_CYLINDER: -0.50
OD_SPHERE: +2.00
OS_OVR_VA: 20/
OD_OVR_VA: 20/
OS_ADD: +2.50
OS_AXIS: 059
OD_VPRISM_DIRECTION: PROGS
OS_ADD: +2.50
OD_ADD: +2.75
OS_CYLINDER: -0.50
OS_OVR_VA: 20/
OD_AXIS: 000
OD_ADD: +2.75
OD_VPRISM_DIRECTION: PROGS
OS_SPHERE: +1.75

## 2022-11-09 ASSESSMENT — KERATOMETRY
OS_K2POWER_DIOPTERS: 41.00
OD_K1POWER_DIOPTERS: 40.75
OS_K2POWER_DIOPTERS: 41.00
OS_K1POWER_DIOPTERS: 41.00
OD_K2POWER_DIOPTERS: 41.25
OD_AXISANGLE_DEGREES: 141
OD_K2POWER_DIOPTERS: 41.25
OS_K1POWER_DIOPTERS: 41.00
OS_AXISANGLE_DEGREES: 090
OD_AXISANGLE_DEGREES: 141
OS_AXISANGLE_DEGREES: 090
OD_K1POWER_DIOPTERS: 40.75

## 2022-11-09 ASSESSMENT — REFRACTION_AUTOREFRACTION
OD_CYLINDER: -0.75
OS_AXIS: 079
OD_CYLINDER: -0.75
OS_CYLINDER: -1.00
OD_AXIS: 096
OS_AXIS: 079
OS_SPHERE: -0.25
OD_AXIS: 096
OS_SPHERE: -0.25
OS_CYLINDER: -1.00
OD_SPHERE: +0.50
OD_SPHERE: +0.50

## 2022-11-09 ASSESSMENT — VISUAL ACUITY
OS_BCVA: 20/20-1
OD_BCVA: 20/25-1
OD_BCVA: 20/25-1
OS_BCVA: 20/20-1

## 2022-12-14 ENCOUNTER — OFFICE (OUTPATIENT)
Dept: URBAN - METROPOLITAN AREA CLINIC 94 | Facility: CLINIC | Age: 72
Setting detail: OPHTHALMOLOGY
End: 2022-12-14
Payer: MEDICARE

## 2022-12-14 DIAGNOSIS — H26.493: ICD-10-CM

## 2022-12-14 PROBLEM — H26.492 POSTERIOR CAPSULAR OPACIFICATION; RIGHT EYE, LEFT EYE, BOTH EYES: Status: ACTIVE | Noted: 2022-11-07

## 2022-12-14 PROBLEM — H26.491 POSTERIOR CAPSULAR OPACIFICATION; RIGHT EYE, LEFT EYE, BOTH EYES: Status: ACTIVE | Noted: 2022-11-07

## 2022-12-14 PROCEDURE — 99024 POSTOP FOLLOW-UP VISIT: CPT | Performed by: OPHTHALMOLOGY

## 2022-12-14 ASSESSMENT — REFRACTION_CURRENTRX
OS_OVR_VA: 20/
OD_VPRISM_DIRECTION: PROGS
OD_SPHERE: +2.00
OS_SPHERE: +1.75
OS_CYLINDER: -0.50
OD_OVR_VA: 20/
OD_ADD: +2.75
OS_VPRISM_DIRECTION: PROGS
OD_AXIS: 000
OS_AXIS: 059
OD_CYLINDER: 0.00
OS_ADD: +2.50

## 2022-12-14 ASSESSMENT — REFRACTION_MANIFEST
OS_ADD: +2.25
OD_ADD: +2.25
OS_VA1: 20/20
OS_VA1: 20/20
OD_SPHERE: 0.00
OS_VA1: 20/25
OS_CYLINDER: -0.50
OS_VA2: 20/20
OS_AXIS: 070
OD_CYLINDER: -0.25
OS_CYLINDER: -1.00
OD_VA1: 20/25
OS_AXIS: 085
OS_SPHERE: -1.00
OU_VA: 20/20
OD_CYLINDER: -0.50
OD_VA2: 20/20
OD_AXIS: 150
OS_SPHERE: -0.75
OS_CYLINDER: -0.50
OS_AXIS: 078
OD_VA1: 20/20
OD_AXIS: 080
OD_SPHERE: PLANO
OS_SPHERE: -0.50

## 2022-12-14 ASSESSMENT — TONOMETRY
OS_IOP_MMHG: 14
OD_IOP_MMHG: 13

## 2022-12-14 ASSESSMENT — REFRACTION_AUTOREFRACTION
OD_SPHERE: +0.50
OD_CYLINDER: -0.75
OD_AXIS: 085
OS_AXIS: 087
OS_CYLINDER: -0.75
OS_SPHERE: 0.00

## 2022-12-14 ASSESSMENT — VISUAL ACUITY
OD_BCVA: 20/20
OS_BCVA: 20/20

## 2022-12-14 ASSESSMENT — AXIALLENGTH_DERIVED
OS_AL: 24.7662
OS_AL: 25.0925
OD_AL: 24.5504
OD_AL: 24.4454
OS_AL: 24.6063
OS_AL: 24.874

## 2022-12-14 ASSESSMENT — SUPERFICIAL PUNCTATE KERATITIS (SPK)
OS_SPK: T 1+
OD_SPK: T 1+

## 2022-12-14 ASSESSMENT — KERATOMETRY
OS_K1POWER_DIOPTERS: 41.25
OS_K2POWER_DIOPTERS: 41.25
OD_AXISANGLE_DEGREES: 145
OS_AXISANGLE_DEGREES: 090
OD_K2POWER_DIOPTERS: 41.50
OD_K1POWER_DIOPTERS: 40.75

## 2022-12-14 ASSESSMENT — CONFRONTATIONAL VISUAL FIELD TEST (CVF)
OS_FINDINGS: FULL
OD_FINDINGS: FULL

## 2022-12-14 ASSESSMENT — SPHEQUIV_DERIVED
OS_SPHEQUIV: -0.75
OD_SPHEQUIV: -0.125
OS_SPHEQUIV: -1
OD_SPHEQUIV: 0.125
OS_SPHEQUIV: -1.5
OS_SPHEQUIV: -0.375

## 2023-01-18 ENCOUNTER — APPOINTMENT (OUTPATIENT)
Dept: DERMATOLOGY | Facility: CLINIC | Age: 73
End: 2023-01-18
Payer: MEDICARE

## 2023-01-18 PROCEDURE — 99202 OFFICE O/P NEW SF 15 MIN: CPT

## 2023-01-24 ENCOUNTER — APPOINTMENT (OUTPATIENT)
Dept: NEUROLOGY | Facility: CLINIC | Age: 73
End: 2023-01-24
Payer: MEDICARE

## 2023-01-24 ENCOUNTER — NON-APPOINTMENT (OUTPATIENT)
Age: 73
End: 2023-01-24

## 2023-01-24 VITALS
WEIGHT: 155 LBS | DIASTOLIC BLOOD PRESSURE: 80 MMHG | BODY MASS INDEX: 24.91 KG/M2 | HEIGHT: 66 IN | SYSTOLIC BLOOD PRESSURE: 120 MMHG

## 2023-01-24 PROCEDURE — 99213 OFFICE O/P EST LOW 20 MIN: CPT

## 2023-01-24 RX ORDER — NITROGLYCERIN 0.4 MG/1
0.4 TABLET SUBLINGUAL
Qty: 30 | Refills: 0 | Status: COMPLETED | COMMUNITY
Start: 2017-06-16 | End: 2023-01-24

## 2023-01-24 RX ORDER — VALSARTAN 40 MG/1
40 TABLET, COATED ORAL
Qty: 30 | Refills: 1 | Status: COMPLETED | COMMUNITY
Start: 2017-01-11 | End: 2023-01-24

## 2023-01-24 NOTE — HISTORY OF PRESENT ILLNESS
[FreeTextEntry1] : 10/6/17:\par This is a 67-year-old woman follows up today with seizure disorder. She is currently taking Lamictal 75 mg twice a day. She is stable without any evidence of seizure activity. When asked she hasn't had any evidence of rash. She did have an episode of atrial fibrillation in June. She was started Eliquis as well as amiodarone for this. She is doing well otherwise. She is here today for routine neurologic followup.\par \par Followup June 26, 2018:\par This is a 68-year-old woman follows up today with seizure disorder. She is also had new complaints of an episode of syncope, headache as well as leg swelling. Regarding her seizures she is stable on lamotrigine 75 mg twice a day. She does not have any seizures since being seen last October however she did have an episode of syncope. She states this was in the setting of stress and dehydration when her  was in the hospital for surgery. She felt as if she was going to collapse and did. She was given orange juice and liquids and then felt better. Subsequent to this she had an episode of dull headache. It started in the occiput and radiated up to the front. She was treated with a Medrol Dosepak and the headache was relieved. Lastly she just returned from a trip to [place] and has bilateral left greater than right leg swelling. She states her leg swelling started while in [place] and she attempted to walking. She is on anticoagulation with Eliquis for her atrial fibrillation. She is here today for neurologic followup and to address these new symptoms.\par \par Followup October 2, 2018:\par This is a 68-year-old woman who presents today for followup of seizure disorder. She's not had any seizures recently. She is maintained on Lamictal 75 mg twice daily. She's taking the medication as directed without side effects. She's overall doing quite well. She is here today for routine neurologic followup.\par \par Followup May 7, 2019:\par This is a 69-year-old woman who presents today for followup of seizure disorder. She is currently taking Lamictal 75 mg twice daily. She has not had any seizures. She tolerates the medication well. She is not missing doses. She's overall doing very well from a seizure point of view. She is here today for routine neurologic followup.\par \par Followup December 17, 2019:\par This is a 69-year-old woman who presents today for followup of seizure disorder. She is currently stable on Lamictal 75 mg twice a day. She is not having any side effects to the medication. She reports excellent compliance. She is here today for routine followup.\par \par Followup January 12, 2021:\par This is a 70-year-old woman who presents today for neurologic followup of seizure disorder. She also had episode of syncope. She went to the hospital in November 2020 for an episode where she felt funny, lightheaded and collapsed without clear loss of consciousness. She went to the hospital at the time of presentation she was hypertensive. Her blood pressure was managed and she was evaluated by cardiology who stated she had no cardiac abnormalities, interrogation of her loop recorder did not reveal any arrhythmia per the notes reviewed. She had a head CT which I reviewed which did not show acute stroke mass or bleed. She was discharged and then subsequently a few weeks later had another syncopal event however this time she states her  measure her blood pressure to be 63/48. There was urinary incontinence associated with this. She lost consciousness for a brief moment. There was no clear convulsive activity. She continues to take Lamictal 75 mg twice daily. She is here today for neurologic followup and treatment of these events.\par \par Followup January 26, 2022:\par This is a 72-year-old woman who presents today for neurologic followup of seizure. She's had events of syncope which ordinarily not to blood pressure and amiodarone. Amiodarone has been adjusted and is evidence of stopped. She is currently taking Lamictal 100 mg twice a day. She tolerates this well and is not have seizure. She is here today for neurologic followup.\par \par Follow-up January 24, 2023:\par This is a 72-year-old woman who presents today for seizure.  She is currently taking Lamictal 100 mg twice a day.  She is compliant with the medication, tolerates it well and has had no breakthrough seizures.  She is overall stable since her last visit.  She is here today for routine neurologic follow-up.

## 2023-01-24 NOTE — CONSULT LETTER
[Dear  ___] : Dear  [unfilled], [Courtesy Letter:] : I had the pleasure of seeing your patient, [unfilled], in my office today. [Please see my note below.] : Please see my note below. [Consult Closing:] : Thank you very much for allowing me to participate in the care of this patient.  If you have any questions, please do not hesitate to contact me. [Sincerely,] : Sincerely, [FreeTextEntry3] : Mauro Barboza M.D., Ph.D. DPN-N\par [facility]\par Neurology at [place]\par Medical Director of Stroke Services\par [facility]\par

## 2023-01-24 NOTE — ASSESSMENT
[FreeTextEntry1] : This is a 72-year-old woman with history of seizure disorder.  She is currently stable on Lamictal 100 mg twice a day.  She will continue this and I will see her back in 1 year, sooner should the need arise.

## 2023-01-31 ENCOUNTER — APPOINTMENT (OUTPATIENT)
Dept: DERMATOLOGY | Facility: CLINIC | Age: 73
End: 2023-01-31

## 2023-03-14 ENCOUNTER — OFFICE (OUTPATIENT)
Dept: URBAN - METROPOLITAN AREA CLINIC 114 | Facility: CLINIC | Age: 73
Setting detail: OPHTHALMOLOGY
End: 2023-03-14
Payer: MEDICARE

## 2023-03-14 DIAGNOSIS — H16.221: ICD-10-CM

## 2023-03-14 DIAGNOSIS — H35.033: ICD-10-CM

## 2023-03-14 DIAGNOSIS — H43.813: ICD-10-CM

## 2023-03-14 DIAGNOSIS — H16.223: ICD-10-CM

## 2023-03-14 DIAGNOSIS — H16.222: ICD-10-CM

## 2023-03-14 DIAGNOSIS — H11.31: ICD-10-CM

## 2023-03-14 DIAGNOSIS — H35.363: ICD-10-CM

## 2023-03-14 DIAGNOSIS — Z96.1: ICD-10-CM

## 2023-03-14 PROCEDURE — 92250 FUNDUS PHOTOGRAPHY W/I&R: CPT | Performed by: OPHTHALMOLOGY

## 2023-03-14 PROCEDURE — 99213 OFFICE O/P EST LOW 20 MIN: CPT | Performed by: OPHTHALMOLOGY

## 2023-03-14 PROCEDURE — 83861 MICROFLUID ANALY TEARS: CPT | Performed by: OPHTHALMOLOGY

## 2023-03-14 ASSESSMENT — REFRACTION_MANIFEST
OS_AXIS: 078
OD_ADD: +2.25
OS_SPHERE: -0.75
OD_VA1: 20/25
OS_SPHERE: -0.50
OS_ADD: +2.25
OD_AXIS: 150
OD_VA1: 20/20
OS_CYLINDER: -0.50
OD_AXIS: 080
OS_CYLINDER: -0.50
OS_VA1: 20/20
OD_VA2: 20/20
OD_SPHERE: 0.00
OS_SPHERE: -1.00
OU_VA: 20/20
OS_AXIS: 070
OS_VA1: 20/25
OS_AXIS: 085
OS_VA2: 20/20
OD_SPHERE: PLANO
OS_CYLINDER: -1.00
OD_CYLINDER: -0.25
OS_VA1: 20/20
OD_CYLINDER: -0.50

## 2023-03-14 ASSESSMENT — KERATOMETRY
OS_K1POWER_DIOPTERS: 41.00
OD_K2POWER_DIOPTERS: 41.75
OD_K1POWER_DIOPTERS: 41.00
OS_AXISANGLE_DEGREES: 151
OS_K2POWER_DIOPTERS: 41.25
OD_AXISANGLE_DEGREES: 134

## 2023-03-14 ASSESSMENT — VISUAL ACUITY
OS_BCVA: 20/20
OD_BCVA: 20/30

## 2023-03-14 ASSESSMENT — REFRACTION_CURRENTRX
OD_CYLINDER: 0.00
OD_AXIS: 000
OD_OVR_VA: 20/
OS_VPRISM_DIRECTION: PROGS
OS_SPHERE: +1.75
OS_AXIS: 059
OD_ADD: +2.75
OS_OVR_VA: 20/
OS_CYLINDER: -0.50
OS_ADD: +2.50
OD_SPHERE: +2.00
OD_VPRISM_DIRECTION: PROGS

## 2023-03-14 ASSESSMENT — REFRACTION_AUTOREFRACTION
OD_SPHERE: +0.75
OS_SPHERE: -0.50
OS_AXIS: 071
OD_CYLINDER: -0.75
OD_AXIS: 009
OS_CYLINDER: -0.75

## 2023-03-14 ASSESSMENT — AXIALLENGTH_DERIVED
OS_AL: 24.8709
OD_AL: 24.4513
OS_AL: 24.8169
OS_AL: 25.1445
OD_AL: 24.2439
OS_AL: 24.9252

## 2023-03-14 ASSESSMENT — SPHEQUIV_DERIVED
OS_SPHEQUIV: -1
OS_SPHEQUIV: -1.5
OD_SPHEQUIV: 0.375
OS_SPHEQUIV: -0.75
OS_SPHEQUIV: -0.875
OD_SPHEQUIV: -0.125

## 2023-03-14 ASSESSMENT — TONOMETRY
OD_IOP_MMHG: 12
OS_IOP_MMHG: 13

## 2023-03-14 ASSESSMENT — SUPERFICIAL PUNCTATE KERATITIS (SPK)
OD_SPK: T 1+
OS_SPK: T 1+

## 2023-03-14 ASSESSMENT — CONFRONTATIONAL VISUAL FIELD TEST (CVF)
OS_FINDINGS: FULL
OD_FINDINGS: FULL

## 2023-04-12 ENCOUNTER — OFFICE (OUTPATIENT)
Dept: URBAN - METROPOLITAN AREA CLINIC 94 | Facility: CLINIC | Age: 73
Setting detail: OPHTHALMOLOGY
End: 2023-04-12
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
## 2023-04-12 PROCEDURE — NO SHOW FE NO SHOW FEE: Performed by: PHYSICIAN ASSISTANT

## 2023-05-19 ENCOUNTER — NON-APPOINTMENT (OUTPATIENT)
Age: 73
End: 2023-05-19

## 2023-07-02 ENCOUNTER — NON-APPOINTMENT (OUTPATIENT)
Age: 73
End: 2023-07-02

## 2023-07-11 ENCOUNTER — NON-APPOINTMENT (OUTPATIENT)
Age: 73
End: 2023-07-11

## 2023-07-13 ENCOUNTER — NON-APPOINTMENT (OUTPATIENT)
Age: 73
End: 2023-07-13

## 2023-08-01 ENCOUNTER — OFFICE (OUTPATIENT)
Dept: URBAN - METROPOLITAN AREA CLINIC 63 | Facility: CLINIC | Age: 73
Setting detail: OPHTHALMOLOGY
End: 2023-08-01
Payer: MEDICARE

## 2023-08-01 DIAGNOSIS — Z96.1: ICD-10-CM

## 2023-08-01 DIAGNOSIS — H00.12: ICD-10-CM

## 2023-08-01 DIAGNOSIS — H16.223: ICD-10-CM

## 2023-08-01 DIAGNOSIS — H16.222: ICD-10-CM

## 2023-08-01 DIAGNOSIS — H16.221: ICD-10-CM

## 2023-08-01 PROCEDURE — 83861 MICROFLUID ANALY TEARS: CPT | Performed by: OPHTHALMOLOGY

## 2023-08-01 PROCEDURE — 99213 OFFICE O/P EST LOW 20 MIN: CPT | Performed by: OPHTHALMOLOGY

## 2023-08-01 PROCEDURE — 92285 EXTERNAL OCULAR PHOTOGRAPHY: CPT | Performed by: OPHTHALMOLOGY

## 2023-08-01 ASSESSMENT — SPHEQUIV_DERIVED
OD_SPHEQUIV: -0.125
OS_SPHEQUIV: -1
OS_SPHEQUIV: -0.875
OS_SPHEQUIV: -0.75
OD_SPHEQUIV: -0.125
OS_SPHEQUIV: -1.5

## 2023-08-01 ASSESSMENT — KERATOMETRY
OD_K1POWER_DIOPTERS: 41.00
OS_K1POWER_DIOPTERS: 41.00
OS_K2POWER_DIOPTERS: 41.25
OD_K2POWER_DIOPTERS: 41.75
OS_AXISANGLE_DEGREES: 151
OD_AXISANGLE_DEGREES: 134

## 2023-08-01 ASSESSMENT — AXIALLENGTH_DERIVED
OD_AL: 24.4513
OS_AL: 25.1445
OD_AL: 24.4513
OS_AL: 24.9252
OS_AL: 24.8169
OS_AL: 24.8709

## 2023-08-01 ASSESSMENT — REFRACTION_CURRENTRX
OS_SPHERE: +1.75
OD_AXIS: 000
OD_OVR_VA: 20/
OS_ADD: +2.50
OD_CYLINDER: 0.00
OD_ADD: +2.75
OS_VPRISM_DIRECTION: PROGS
OD_SPHERE: +2.00
OS_CYLINDER: -0.50
OS_AXIS: 059
OS_OVR_VA: 20/
OD_VPRISM_DIRECTION: PROGS

## 2023-08-01 ASSESSMENT — SUPERFICIAL PUNCTATE KERATITIS (SPK)
OD_SPK: T 1+
OS_SPK: T 1+

## 2023-08-01 ASSESSMENT — REFRACTION_AUTOREFRACTION
OD_CYLINDER: -0.75
OS_SPHERE: -0.50
OD_AXIS: 060
OS_CYLINDER: -0.75
OD_SPHERE: +0.25
OS_AXIS: 075

## 2023-08-01 ASSESSMENT — REFRACTION_MANIFEST
OU_VA: 20/20
OD_CYLINDER: -0.25
OD_VA2: 20/20
OS_VA2: 20/20
OS_CYLINDER: -0.50
OD_VA1: 20/20
OS_VA1: 20/25
OS_CYLINDER: -0.50
OS_AXIS: 070
OS_VA1: 20/20
OD_AXIS: 080
OS_SPHERE: -0.50
OD_CYLINDER: -0.50
OS_SPHERE: -0.75
OS_ADD: +2.25
OD_SPHERE: 0.00
OD_AXIS: 150
OS_VA1: 20/20
OD_SPHERE: PLANO
OS_AXIS: 078
OS_SPHERE: -1.00
OD_ADD: +2.25
OS_CYLINDER: -1.00
OS_AXIS: 085
OD_VA1: 20/25

## 2023-08-01 ASSESSMENT — TONOMETRY
OS_IOP_MMHG: 15
OD_IOP_MMHG: 16

## 2023-08-01 ASSESSMENT — CONFRONTATIONAL VISUAL FIELD TEST (CVF)
OD_FINDINGS: FULL
OS_FINDINGS: FULL

## 2023-08-01 ASSESSMENT — VISUAL ACUITY
OD_BCVA: 20/25
OS_BCVA: 20/20-1

## 2023-08-08 ENCOUNTER — OFFICE (OUTPATIENT)
Dept: URBAN - METROPOLITAN AREA CLINIC 115 | Facility: CLINIC | Age: 73
Setting detail: OPHTHALMOLOGY
End: 2023-08-08
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
## 2023-08-08 PROCEDURE — NO SHOW FE NO SHOW FEE: Performed by: OPHTHALMOLOGY

## 2023-08-09 ENCOUNTER — RX RENEWAL (OUTPATIENT)
Age: 73
End: 2023-08-09

## 2023-08-18 ENCOUNTER — NON-APPOINTMENT (OUTPATIENT)
Age: 73
End: 2023-08-18

## 2023-10-03 ENCOUNTER — NON-APPOINTMENT (OUTPATIENT)
Age: 73
End: 2023-10-03

## 2023-12-14 ENCOUNTER — APPOINTMENT (OUTPATIENT)
Dept: NEUROLOGY | Facility: CLINIC | Age: 73
End: 2023-12-14
Payer: MEDICARE

## 2023-12-14 VITALS
SYSTOLIC BLOOD PRESSURE: 150 MMHG | BODY MASS INDEX: 25.07 KG/M2 | HEIGHT: 66 IN | DIASTOLIC BLOOD PRESSURE: 80 MMHG | WEIGHT: 156 LBS

## 2023-12-14 DIAGNOSIS — G40.909 EPILEPSY, UNSPECIFIED, NOT INTRACTABLE, W/OUT STATUS EPILEPTICUS: ICD-10-CM

## 2023-12-14 PROCEDURE — 99213 OFFICE O/P EST LOW 20 MIN: CPT

## 2023-12-14 NOTE — ASSESSMENT
[FreeTextEntry1] : This is a 73-year-old woman with seizure disorder currently stable on Lamictal 100 mg twice a day.  I will continue this for her and see her back in 1 year, sooner should the need arise.  She knows to call me with any problems, questions concerns or for breakthrough seizures between now and her next appointment.

## 2023-12-14 NOTE — HISTORY OF PRESENT ILLNESS
[FreeTextEntry1] : 10/6/17: This is a 67-year-old woman follows up today with seizure disorder. She is currently taking Lamictal 75 mg twice a day. She is stable without any evidence of seizure activity. When asked she hasn't had any evidence of rash. She did have an episode of atrial fibrillation in June. She was started Eliquis as well as amiodarone for this. She is doing well otherwise. She is here today for routine neurologic followup.  Followup June 26, 2018: This is a 68-year-old woman follows up today with seizure disorder. She is also had new complaints of an episode of syncope, headache as well as leg swelling. Regarding her seizures she is stable on lamotrigine 75 mg twice a day. She does not have any seizures since being seen last October however she did have an episode of syncope. She states this was in the setting of stress and dehydration when her  was in the hospital for surgery. She felt as if she was going to collapse and did. She was given orange juice and liquids and then felt better. Subsequent to this she had an episode of dull headache. It started in the occiput and radiated up to the front. She was treated with a Medrol Dosepak and the headache was relieved. Lastly she just returned from a trip to [place] and has bilateral left greater than right leg swelling. She states her leg swelling started while in [place] and she attempted to walking. She is on anticoagulation with Eliquis for her atrial fibrillation. She is here today for neurologic followup and to address these new symptoms.  Followup October 2, 2018: This is a 68-year-old woman who presents today for followup of seizure disorder. She's not had any seizures recently. She is maintained on Lamictal 75 mg twice daily. She's taking the medication as directed without side effects. She's overall doing quite well. She is here today for routine neurologic followup.  Followup May 7, 2019: This is a 69-year-old woman who presents today for followup of seizure disorder. She is currently taking Lamictal 75 mg twice daily. She has not had any seizures. She tolerates the medication well. She is not missing doses. She's overall doing very well from a seizure point of view. She is here today for routine neurologic followup.  Followup December 17, 2019: This is a 69-year-old woman who presents today for followup of seizure disorder. She is currently stable on Lamictal 75 mg twice a day. She is not having any side effects to the medication. She reports excellent compliance. She is here today for routine followup.  Followup January 12, 2021: This is a 70-year-old woman who presents today for neurologic followup of seizure disorder. She also had episode of syncope. She went to the hospital in November 2020 for an episode where she felt funny, lightheaded and collapsed without clear loss of consciousness. She went to the hospital at the time of presentation she was hypertensive. Her blood pressure was managed and she was evaluated by cardiology who stated she had no cardiac abnormalities, interrogation of her loop recorder did not reveal any arrhythmia per the notes reviewed. She had a head CT which I reviewed which did not show acute stroke mass or bleed. She was discharged and then subsequently a few weeks later had another syncopal event however this time she states her  measure her blood pressure to be 63/48. There was urinary incontinence associated with this. She lost consciousness for a brief moment. There was no clear convulsive activity. She continues to take Lamictal 75 mg twice daily. She is here today for neurologic followup and treatment of these events.  Followup January 26, 2022: This is a 72-year-old woman who presents today for neurologic followup of seizure. She's had events of syncope which ordinarily not to blood pressure and amiodarone. Amiodarone has been adjusted and is evidence of stopped. She is currently taking Lamictal 100 mg twice a day. She tolerates this well and is not have seizure. She is here today for neurologic followup.  Follow-up January 24, 2023: This is a 72-year-old woman who presents today for seizure.  She is currently taking Lamictal 100 mg twice a day.  She is compliant with the medication, tolerates it well and has had no breakthrough seizures.  She is overall stable since her last visit.  She is here today for routine neurologic follow-up.  Follow-up December 14, 2023: This is a 73-year-old woman who presents today with history of seizure.  She is currently taking Lamictal 100 mg twice a day.  She has no breakthrough seizures reports compliance with the medication.  There is no side effects.  Other than a knee replacement recently she has been stable since last year.  She is here today for routine neurologic follow-up.

## 2023-12-14 NOTE — CONSULT LETTER
[Dear  ___] : Dear  [unfilled], [Courtesy Letter:] : I had the pleasure of seeing your patient, [unfilled], in my office today. [Please see my note below.] : Please see my note below. [Consult Closing:] : Thank you very much for allowing me to participate in the care of this patient.  If you have any questions, please do not hesitate to contact me. [Sincerely,] : Sincerely, [FreeTextEntry3] : Mauro Barboza M.D., Ph.D. DPN-N [facility] Neurology at [place] Director, [facility]

## 2024-02-16 ENCOUNTER — RX RENEWAL (OUTPATIENT)
Age: 74
End: 2024-02-16

## 2024-02-16 RX ORDER — LAMOTRIGINE 100 MG/1
100 TABLET ORAL
Qty: 180 | Refills: 2 | Status: ACTIVE | COMMUNITY
Start: 2021-02-22 | End: 1900-01-01

## 2024-06-17 ENCOUNTER — APPOINTMENT (OUTPATIENT)
Dept: OTOLARYNGOLOGY | Facility: CLINIC | Age: 74
End: 2024-06-17
Payer: MEDICARE

## 2024-06-17 DIAGNOSIS — M26.609 UNSPECIFIED TEMPOROMANDIBULAR JOINT DISORDER: ICD-10-CM

## 2024-06-17 DIAGNOSIS — H61.21 IMPACTED CERUMEN, RIGHT EAR: ICD-10-CM

## 2024-06-17 DIAGNOSIS — M26.4 MALOCCLUSION, UNSPECIFIED: ICD-10-CM

## 2024-06-17 PROCEDURE — 99202 OFFICE O/P NEW SF 15 MIN: CPT | Mod: 25

## 2024-06-17 PROCEDURE — 69210 REMOVE IMPACTED EAR WAX UNI: CPT

## 2024-06-17 RX ORDER — METOPROLOL SUCCINATE 50 MG/1
50 TABLET, EXTENDED RELEASE ORAL
Refills: 0 | Status: ACTIVE | COMMUNITY

## 2024-06-17 RX ORDER — ATORVASTATIN CALCIUM 40 MG/1
40 TABLET, FILM COATED ORAL
Refills: 0 | Status: ACTIVE | COMMUNITY

## 2024-06-17 RX ORDER — EZETIMIBE 10 MG/1
TABLET ORAL
Refills: 0 | Status: ACTIVE | COMMUNITY

## 2024-06-17 RX ORDER — DRONEDARONE 400 MG/1
400 TABLET, FILM COATED ORAL
Refills: 0 | Status: ACTIVE | COMMUNITY

## 2024-06-17 RX ORDER — LEVOTHYROXINE SODIUM 0.2 MG/1
200 TABLET ORAL
Refills: 0 | Status: ACTIVE | COMMUNITY

## 2024-06-17 RX ORDER — DICYCLOMINE HYDROCHLORIDE 10 MG/1
10 CAPSULE ORAL
Refills: 0 | Status: ACTIVE | COMMUNITY

## 2024-06-17 RX ORDER — TORSEMIDE 10 MG/1
10 TABLET ORAL
Refills: 0 | Status: ACTIVE | COMMUNITY

## 2024-06-17 RX ORDER — VALSARTAN 40 MG/1
40 TABLET, COATED ORAL
Refills: 0 | Status: ACTIVE | COMMUNITY

## 2024-06-17 NOTE — ASSESSMENT
[FreeTextEntry1] : Dry blood/wax removed off floor of right EAC.  Patient pressed in same area and it felt better. This hard piece may have been causing some problems.  I have recommended TMJ Precautions.  Consider soft diet, warm packs, massage and NSAIDs if not contraindicated for TMJ pain.  Seek Dental/OMFS consultation for continued problem.  Seek attention for otalgia, ottorhea, bleeding, headache, hearing loss, dizziness or vertigo.  F/u PRN

## 2024-06-17 NOTE — PHYSICAL EXAM
[FreeTextEntry8] : some dry blood/ wax impaction removed with forceps without complications. [Normal] : mucosa is normal [Midline] : trachea located in midline position [de-identified] : missing teeth and some malocclusion

## 2024-06-17 NOTE — CONSULT LETTER
[Dear  ___] : Dear  [unfilled], [Courtesy Letter:] : I had the pleasure of seeing your patient, [unfilled], in my office today. [Please see my note below.] : Please see my note below. [Sincerely,] : Sincerely, [FreeTextEntry2] : Brady Suarez, DO

## 2024-06-17 NOTE — HISTORY OF PRESENT ILLNESS
[de-identified] : 74F presents for non-radiating right ear pain x2-3 weeks. It is worsened when she presses under her ear. States that her right ear feels clogged and she has mild pulsing and ringing of the ear. Patient denies otorrhea, ear infections, hearing loss, dizziness, vertigo.

## 2024-08-27 ENCOUNTER — OFFICE (OUTPATIENT)
Dept: URBAN - METROPOLITAN AREA CLINIC 114 | Facility: CLINIC | Age: 74
Setting detail: OPHTHALMOLOGY
End: 2024-08-27
Payer: MEDICARE

## 2024-08-27 DIAGNOSIS — H35.033: ICD-10-CM

## 2024-08-27 DIAGNOSIS — H16.223: ICD-10-CM

## 2024-08-27 DIAGNOSIS — H26.492: ICD-10-CM

## 2024-08-27 PROCEDURE — 92250 FUNDUS PHOTOGRAPHY W/I&R: CPT | Performed by: OPHTHALMOLOGY

## 2024-08-27 PROCEDURE — 92014 COMPRE OPH EXAM EST PT 1/>: CPT | Performed by: OPHTHALMOLOGY

## 2024-08-27 ASSESSMENT — CONFRONTATIONAL VISUAL FIELD TEST (CVF)
OS_FINDINGS: FULL
OD_FINDINGS: FULL

## 2024-11-06 ENCOUNTER — RX RENEWAL (OUTPATIENT)
Age: 74
End: 2024-11-06

## 2024-12-10 ENCOUNTER — APPOINTMENT (OUTPATIENT)
Dept: NEUROLOGY | Facility: CLINIC | Age: 74
End: 2024-12-10
Payer: MEDICARE

## 2024-12-10 VITALS
SYSTOLIC BLOOD PRESSURE: 138 MMHG | HEART RATE: 71 BPM | HEIGHT: 66 IN | DIASTOLIC BLOOD PRESSURE: 76 MMHG | WEIGHT: 156 LBS | BODY MASS INDEX: 25.07 KG/M2

## 2024-12-10 DIAGNOSIS — G40.909 EPILEPSY, UNSPECIFIED, NOT INTRACTABLE, W/OUT STATUS EPILEPTICUS: ICD-10-CM

## 2024-12-10 PROCEDURE — G2211 COMPLEX E/M VISIT ADD ON: CPT

## 2024-12-10 PROCEDURE — 99213 OFFICE O/P EST LOW 20 MIN: CPT

## 2025-06-03 ENCOUNTER — NON-APPOINTMENT (OUTPATIENT)
Age: 75
End: 2025-06-03